# Patient Record
Sex: FEMALE | Race: WHITE | NOT HISPANIC OR LATINO | Employment: OTHER | URBAN - METROPOLITAN AREA
[De-identification: names, ages, dates, MRNs, and addresses within clinical notes are randomized per-mention and may not be internally consistent; named-entity substitution may affect disease eponyms.]

---

## 2021-02-09 ENCOUNTER — TRANSCRIBE ORDERS (OUTPATIENT)
Dept: ADMINISTRATIVE | Facility: HOSPITAL | Age: 86
End: 2021-02-09

## 2021-02-09 ENCOUNTER — APPOINTMENT (OUTPATIENT)
Dept: RADIOLOGY | Facility: CLINIC | Age: 86
End: 2021-02-09
Payer: MEDICARE

## 2021-02-09 DIAGNOSIS — M54.2 CERVICALGIA: ICD-10-CM

## 2021-02-09 DIAGNOSIS — M54.2 CERVICALGIA: Primary | ICD-10-CM

## 2021-02-09 PROCEDURE — 72050 X-RAY EXAM NECK SPINE 4/5VWS: CPT

## 2022-02-15 ENCOUNTER — HOSPITAL ENCOUNTER (INPATIENT)
Facility: HOSPITAL | Age: 87
LOS: 3 days | Discharge: HOME HEALTH | DRG: 689 | End: 2022-02-18
Attending: EMERGENCY MEDICINE | Admitting: INTERNAL MEDICINE
Payer: MEDICARE

## 2022-02-15 ENCOUNTER — APPOINTMENT (EMERGENCY)
Dept: RADIOLOGY | Facility: HOSPITAL | Age: 87
DRG: 689 | End: 2022-02-15
Payer: MEDICARE

## 2022-02-15 DIAGNOSIS — G93.40 ACUTE ENCEPHALOPATHY: ICD-10-CM

## 2022-02-15 DIAGNOSIS — G89.29 CHRONIC PAIN: Primary | ICD-10-CM

## 2022-02-15 DIAGNOSIS — N39.0 UTI (URINARY TRACT INFECTION): ICD-10-CM

## 2022-02-15 DIAGNOSIS — R45.1 AGITATION: ICD-10-CM

## 2022-02-15 DIAGNOSIS — E86.0 ACUTE DEHYDRATION: ICD-10-CM

## 2022-02-15 PROBLEM — G47.33 OBSTRUCTIVE SLEEP APNEA SYNDROME: Status: ACTIVE | Noted: 2022-02-15

## 2022-02-15 PROBLEM — R53.1 WEAKNESS: Status: ACTIVE | Noted: 2021-03-29

## 2022-02-15 PROBLEM — M79.602 LEFT ARM PAIN: Status: ACTIVE | Noted: 2022-02-15

## 2022-02-15 PROBLEM — R20.0 LEFT UPPER EXTREMITY NUMBNESS: Status: ACTIVE | Noted: 2021-03-30

## 2022-02-15 PROBLEM — S43.006A SHOULDER DISLOCATION: Status: ACTIVE | Noted: 2021-03-31

## 2022-02-15 PROBLEM — M54.50 LOW BACK PAIN: Status: ACTIVE | Noted: 2021-03-30

## 2022-02-15 PROBLEM — R41.82 ALTERED MENTAL STATUS: Status: ACTIVE | Noted: 2021-03-29

## 2022-02-15 PROBLEM — Z90.5 ABSENT KIDNEY: Status: ACTIVE | Noted: 2022-02-15

## 2022-02-15 PROBLEM — N17.9 AKI (ACUTE KIDNEY INJURY) (HCC): Status: ACTIVE | Noted: 2022-02-15

## 2022-02-15 PROBLEM — I10 ESSENTIAL HYPERTENSION: Status: ACTIVE | Noted: 2022-02-15

## 2022-02-15 PROBLEM — G93.41 ACUTE METABOLIC ENCEPHALOPATHY: Status: ACTIVE | Noted: 2022-02-15

## 2022-02-15 PROBLEM — F41.9 ANXIETY AND DEPRESSION: Status: ACTIVE | Noted: 2022-02-15

## 2022-02-15 PROBLEM — F32.A ANXIETY AND DEPRESSION: Status: ACTIVE | Noted: 2022-02-15

## 2022-02-15 LAB
ALBUMIN SERPL BCP-MCNC: 2.8 G/DL (ref 3.5–5)
ALP SERPL-CCNC: 97 U/L (ref 46–116)
ALT SERPL W P-5'-P-CCNC: 12 U/L (ref 12–78)
ANION GAP SERPL CALCULATED.3IONS-SCNC: 5 MMOL/L (ref 4–13)
ARTERIAL PATENCY WRIST A: ABNORMAL
AST SERPL W P-5'-P-CCNC: 12 U/L (ref 5–45)
BACTERIA UR QL AUTO: ABNORMAL /HPF
BASE EXCESS BLDA CALC-SCNC: 1 MMOL/L (ref -2–3)
BASOPHILS # BLD AUTO: 0.05 THOUSANDS/ΜL (ref 0–0.1)
BASOPHILS NFR BLD AUTO: 1 % (ref 0–1)
BILIRUB SERPL-MCNC: 0.2 MG/DL (ref 0.2–1)
BILIRUB UR QL STRIP: NEGATIVE
BUN SERPL-MCNC: 19 MG/DL (ref 5–25)
CA-I BLD-SCNC: 1.28 MMOL/L (ref 1.12–1.32)
CALCIUM ALBUM COR SERPL-MCNC: 9.8 MG/DL (ref 8.3–10.1)
CALCIUM SERPL-MCNC: 8.8 MG/DL (ref 8.3–10.1)
CHLORIDE SERPL-SCNC: 100 MMOL/L (ref 100–108)
CLARITY UR: CLEAR
CO2 SERPL-SCNC: 29 MMOL/L (ref 21–32)
COLOR UR: YELLOW
CREAT SERPL-MCNC: 1.57 MG/DL (ref 0.6–1.3)
EOSINOPHIL # BLD AUTO: 0.14 THOUSAND/ΜL (ref 0–0.61)
EOSINOPHIL NFR BLD AUTO: 2 % (ref 0–6)
ERYTHROCYTE [DISTWIDTH] IN BLOOD BY AUTOMATED COUNT: 13.1 % (ref 11.6–15.1)
FIO2 GAS DIL.REBREATH: 21 L
GFR SERPL CREATININE-BSD FRML MDRD: 29 ML/MIN/1.73SQ M
GLUCOSE SERPL-MCNC: 108 MG/DL (ref 65–140)
GLUCOSE SERPL-MCNC: 122 MG/DL (ref 65–140)
GLUCOSE UR STRIP-MCNC: NEGATIVE MG/DL
HCO3 BLDA-SCNC: 26.2 MMOL/L (ref 22–28)
HCT VFR BLD AUTO: 35.9 % (ref 34.8–46.1)
HCT VFR BLD CALC: 32 % (ref 34.8–46.1)
HGB BLD-MCNC: 11.1 G/DL (ref 11.5–15.4)
HGB BLDA-MCNC: 10.9 G/DL (ref 11.5–15.4)
HGB UR QL STRIP.AUTO: ABNORMAL
IMM GRANULOCYTES # BLD AUTO: 0.02 THOUSAND/UL (ref 0–0.2)
IMM GRANULOCYTES NFR BLD AUTO: 0 % (ref 0–2)
KETONES UR STRIP-MCNC: NEGATIVE MG/DL
LEUKOCYTE ESTERASE UR QL STRIP: ABNORMAL
LYMPHOCYTES # BLD AUTO: 1.24 THOUSANDS/ΜL (ref 0.6–4.47)
LYMPHOCYTES NFR BLD AUTO: 17 % (ref 14–44)
MCH RBC QN AUTO: 28.5 PG (ref 26.8–34.3)
MCHC RBC AUTO-ENTMCNC: 30.9 G/DL (ref 31.4–37.4)
MCV RBC AUTO: 92 FL (ref 82–98)
MONOCYTES # BLD AUTO: 0.66 THOUSAND/ΜL (ref 0.17–1.22)
MONOCYTES NFR BLD AUTO: 9 % (ref 4–12)
NEUTROPHILS # BLD AUTO: 5.1 THOUSANDS/ΜL (ref 1.85–7.62)
NEUTS SEG NFR BLD AUTO: 71 % (ref 43–75)
NITRITE UR QL STRIP: NEGATIVE
NON-SQ EPI CELLS URNS QL MICRO: ABNORMAL /HPF
NRBC BLD AUTO-RTO: 0 /100 WBCS
PCO2 BLD: 28 MMOL/L (ref 21–32)
PCO2 BLD: 44.9 MM HG (ref 36–44)
PH BLD: 7.37 [PH] (ref 7.35–7.45)
PH UR STRIP.AUTO: 6 [PH]
PLATELET # BLD AUTO: 283 THOUSANDS/UL (ref 149–390)
PMV BLD AUTO: 8.5 FL (ref 8.9–12.7)
PO2 BLD: 76 MM HG (ref 75–129)
POTASSIUM BLD-SCNC: 4.6 MMOL/L (ref 3.5–5.3)
POTASSIUM SERPL-SCNC: 4.7 MMOL/L (ref 3.5–5.3)
PROT SERPL-MCNC: 6.4 G/DL (ref 6.4–8.2)
PROT UR STRIP-MCNC: NEGATIVE MG/DL
RBC # BLD AUTO: 3.9 MILLION/UL (ref 3.81–5.12)
RBC #/AREA URNS AUTO: ABNORMAL /HPF
SAMPLE SITE: ABNORMAL
SAO2 % BLD FROM PO2: 95 % (ref 60–85)
SODIUM BLD-SCNC: 136 MMOL/L (ref 136–145)
SODIUM SERPL-SCNC: 134 MMOL/L (ref 136–145)
SP GR UR STRIP.AUTO: <=1.005 (ref 1–1.03)
SPECIMEN SOURCE: ABNORMAL
TSH SERPL DL<=0.05 MIU/L-ACNC: 2.33 UIU/ML (ref 0.36–3.74)
UROBILINOGEN UR QL STRIP.AUTO: 0.2 E.U./DL
WBC # BLD AUTO: 7.21 THOUSAND/UL (ref 4.31–10.16)
WBC #/AREA URNS AUTO: ABNORMAL /HPF

## 2022-02-15 PROCEDURE — 99285 EMERGENCY DEPT VISIT HI MDM: CPT | Performed by: EMERGENCY MEDICINE

## 2022-02-15 PROCEDURE — 96375 TX/PRO/DX INJ NEW DRUG ADDON: CPT

## 2022-02-15 PROCEDURE — 96374 THER/PROPH/DIAG INJ IV PUSH: CPT

## 2022-02-15 PROCEDURE — 81001 URINALYSIS AUTO W/SCOPE: CPT | Performed by: EMERGENCY MEDICINE

## 2022-02-15 PROCEDURE — 80053 COMPREHEN METABOLIC PANEL: CPT | Performed by: EMERGENCY MEDICINE

## 2022-02-15 PROCEDURE — 93005 ELECTROCARDIOGRAM TRACING: CPT

## 2022-02-15 PROCEDURE — 84443 ASSAY THYROID STIM HORMONE: CPT | Performed by: EMERGENCY MEDICINE

## 2022-02-15 PROCEDURE — 99223 1ST HOSP IP/OBS HIGH 75: CPT | Performed by: INTERNAL MEDICINE

## 2022-02-15 PROCEDURE — 87077 CULTURE AEROBIC IDENTIFY: CPT | Performed by: EMERGENCY MEDICINE

## 2022-02-15 PROCEDURE — 84295 ASSAY OF SERUM SODIUM: CPT

## 2022-02-15 PROCEDURE — 99285 EMERGENCY DEPT VISIT HI MDM: CPT

## 2022-02-15 PROCEDURE — 87086 URINE CULTURE/COLONY COUNT: CPT | Performed by: EMERGENCY MEDICINE

## 2022-02-15 PROCEDURE — 87186 SC STD MICRODIL/AGAR DIL: CPT | Performed by: EMERGENCY MEDICINE

## 2022-02-15 PROCEDURE — 82803 BLOOD GASES ANY COMBINATION: CPT

## 2022-02-15 PROCEDURE — 85014 HEMATOCRIT: CPT

## 2022-02-15 PROCEDURE — 82330 ASSAY OF CALCIUM: CPT

## 2022-02-15 PROCEDURE — 71045 X-RAY EXAM CHEST 1 VIEW: CPT

## 2022-02-15 PROCEDURE — 1123F ACP DISCUSS/DSCN MKR DOCD: CPT | Performed by: EMERGENCY MEDICINE

## 2022-02-15 PROCEDURE — 82947 ASSAY GLUCOSE BLOOD QUANT: CPT

## 2022-02-15 PROCEDURE — 36415 COLL VENOUS BLD VENIPUNCTURE: CPT | Performed by: EMERGENCY MEDICINE

## 2022-02-15 PROCEDURE — 84132 ASSAY OF SERUM POTASSIUM: CPT

## 2022-02-15 PROCEDURE — 36600 WITHDRAWAL OF ARTERIAL BLOOD: CPT

## 2022-02-15 PROCEDURE — 85025 COMPLETE CBC W/AUTO DIFF WBC: CPT | Performed by: EMERGENCY MEDICINE

## 2022-02-15 RX ORDER — ASPIRIN 81 MG/1
81 TABLET, CHEWABLE ORAL DAILY
COMMUNITY

## 2022-02-15 RX ORDER — LORAZEPAM 0.5 MG/1
0.5 TABLET ORAL EVERY 8 HOURS PRN
COMMUNITY
End: 2022-02-18 | Stop reason: HOSPADM

## 2022-02-15 RX ORDER — LOSARTAN POTASSIUM 50 MG/1
100 TABLET ORAL DAILY
Status: DISCONTINUED | OUTPATIENT
Start: 2022-02-16 | End: 2022-02-18 | Stop reason: HOSPADM

## 2022-02-15 RX ORDER — PANTOPRAZOLE SODIUM 40 MG/1
40 INJECTION, POWDER, FOR SOLUTION INTRAVENOUS DAILY
COMMUNITY
End: 2022-02-15

## 2022-02-15 RX ORDER — PANTOPRAZOLE SODIUM 40 MG/1
40 TABLET, DELAYED RELEASE ORAL DAILY
COMMUNITY

## 2022-02-15 RX ORDER — LORAZEPAM 2 MG/ML
1 INJECTION INTRAMUSCULAR ONCE
Status: COMPLETED | OUTPATIENT
Start: 2022-02-15 | End: 2022-02-15

## 2022-02-15 RX ORDER — ASPIRIN 81 MG/1
81 TABLET, CHEWABLE ORAL DAILY
Status: DISCONTINUED | OUTPATIENT
Start: 2022-02-16 | End: 2022-02-18 | Stop reason: HOSPADM

## 2022-02-15 RX ORDER — LORATADINE 10 MG/1
10 TABLET ORAL DAILY
Status: DISCONTINUED | OUTPATIENT
Start: 2022-02-16 | End: 2022-02-18 | Stop reason: HOSPADM

## 2022-02-15 RX ORDER — LIDOCAINE 50 MG/G
1 PATCH TOPICAL ONCE
Status: COMPLETED | OUTPATIENT
Start: 2022-02-15 | End: 2022-02-16

## 2022-02-15 RX ORDER — SODIUM CHLORIDE 9 MG/ML
75 INJECTION, SOLUTION INTRAVENOUS CONTINUOUS
Status: DISCONTINUED | OUTPATIENT
Start: 2022-02-15 | End: 2022-02-17

## 2022-02-15 RX ORDER — OLANZAPINE 10 MG/1
5 INJECTION, POWDER, LYOPHILIZED, FOR SOLUTION INTRAMUSCULAR ONCE
Status: COMPLETED | OUTPATIENT
Start: 2022-02-15 | End: 2022-02-15

## 2022-02-15 RX ORDER — TRAZODONE HYDROCHLORIDE 50 MG/1
100 TABLET ORAL
COMMUNITY

## 2022-02-15 RX ORDER — GABAPENTIN 100 MG/1
100 CAPSULE ORAL DAILY
COMMUNITY

## 2022-02-15 RX ORDER — ACETAMINOPHEN 325 MG/1
650 TABLET ORAL EVERY 6 HOURS PRN
Status: DISCONTINUED | OUTPATIENT
Start: 2022-02-15 | End: 2022-02-18 | Stop reason: HOSPADM

## 2022-02-15 RX ORDER — CEFTRIAXONE 1 G/50ML
1000 INJECTION, SOLUTION INTRAVENOUS ONCE
Status: COMPLETED | OUTPATIENT
Start: 2022-02-15 | End: 2022-02-15

## 2022-02-15 RX ORDER — ONDANSETRON 2 MG/ML
4 INJECTION INTRAMUSCULAR; INTRAVENOUS EVERY 6 HOURS PRN
Status: DISCONTINUED | OUTPATIENT
Start: 2022-02-15 | End: 2022-02-18 | Stop reason: HOSPADM

## 2022-02-15 RX ORDER — LOSARTAN POTASSIUM 50 MG/1
100 TABLET ORAL DAILY
COMMUNITY

## 2022-02-15 RX ORDER — HEPARIN SODIUM 5000 [USP'U]/ML
5000 INJECTION, SOLUTION INTRAVENOUS; SUBCUTANEOUS EVERY 8 HOURS SCHEDULED
Status: DISCONTINUED | OUTPATIENT
Start: 2022-02-15 | End: 2022-02-18 | Stop reason: HOSPADM

## 2022-02-15 RX ORDER — CEFTRIAXONE 1 G/50ML
1000 INJECTION, SOLUTION INTRAVENOUS EVERY 24 HOURS
Status: DISCONTINUED | OUTPATIENT
Start: 2022-02-16 | End: 2022-02-18 | Stop reason: HOSPADM

## 2022-02-15 RX ORDER — AMLODIPINE BESYLATE 5 MG/1
10 TABLET ORAL DAILY
Status: DISCONTINUED | OUTPATIENT
Start: 2022-02-16 | End: 2022-02-18 | Stop reason: HOSPADM

## 2022-02-15 RX ORDER — MORPHINE SULFATE 4 MG/ML
4 INJECTION, SOLUTION INTRAMUSCULAR; INTRAVENOUS ONCE
Status: COMPLETED | OUTPATIENT
Start: 2022-02-15 | End: 2022-02-15

## 2022-02-15 RX ORDER — ATORVASTATIN CALCIUM 40 MG/1
40 TABLET, FILM COATED ORAL
Status: DISCONTINUED | OUTPATIENT
Start: 2022-02-16 | End: 2022-02-18 | Stop reason: HOSPADM

## 2022-02-15 RX ORDER — PANTOPRAZOLE SODIUM 40 MG/1
40 TABLET, DELAYED RELEASE ORAL DAILY
Status: DISCONTINUED | OUTPATIENT
Start: 2022-02-16 | End: 2022-02-18 | Stop reason: HOSPADM

## 2022-02-15 RX ORDER — AMLODIPINE BESYLATE 10 MG/1
10 TABLET ORAL DAILY
COMMUNITY

## 2022-02-15 RX ORDER — CETIRIZINE HYDROCHLORIDE 10 MG/1
10 TABLET ORAL DAILY
COMMUNITY

## 2022-02-15 RX ORDER — ESCITALOPRAM OXALATE 5 MG/1
5 TABLET ORAL DAILY
Status: DISCONTINUED | OUTPATIENT
Start: 2022-02-16 | End: 2022-02-18 | Stop reason: HOSPADM

## 2022-02-15 RX ORDER — HALOPERIDOL 5 MG/ML
2 INJECTION INTRAMUSCULAR ONCE
Status: DISCONTINUED | OUTPATIENT
Start: 2022-02-15 | End: 2022-02-15

## 2022-02-15 RX ORDER — GABAPENTIN 100 MG/1
100 CAPSULE ORAL 3 TIMES DAILY
Status: DISCONTINUED | OUTPATIENT
Start: 2022-02-15 | End: 2022-02-18 | Stop reason: HOSPADM

## 2022-02-15 RX ORDER — OXYCODONE HYDROCHLORIDE 5 MG/1
2.5 TABLET ORAL EVERY 6 HOURS PRN
Status: DISCONTINUED | OUTPATIENT
Start: 2022-02-15 | End: 2022-02-17

## 2022-02-15 RX ORDER — ESCITALOPRAM OXALATE 5 MG/1
1 TABLET ORAL DAILY
COMMUNITY

## 2022-02-15 RX ORDER — ROSUVASTATIN CALCIUM 20 MG/1
20 TABLET, COATED ORAL DAILY
COMMUNITY

## 2022-02-15 RX ADMIN — GABAPENTIN 100 MG: 100 CAPSULE ORAL at 23:34

## 2022-02-15 RX ADMIN — OLANZAPINE 5 MG: 10 INJECTION, POWDER, FOR SOLUTION INTRAMUSCULAR at 20:48

## 2022-02-15 RX ADMIN — CEFTRIAXONE 1000 MG: 1 INJECTION, SOLUTION INTRAVENOUS at 20:53

## 2022-02-15 RX ADMIN — HEPARIN SODIUM 5000 UNITS: 5000 INJECTION, SOLUTION INTRAVENOUS; SUBCUTANEOUS at 23:34

## 2022-02-15 RX ADMIN — MORPHINE SULFATE 4 MG: 4 INJECTION INTRAVENOUS at 15:53

## 2022-02-15 RX ADMIN — SODIUM CHLORIDE 1000 ML: 0.9 INJECTION, SOLUTION INTRAVENOUS at 16:26

## 2022-02-15 RX ADMIN — LORAZEPAM 1 MG: 2 INJECTION INTRAMUSCULAR; INTRAVENOUS at 17:11

## 2022-02-15 RX ADMIN — SODIUM CHLORIDE 125 ML/HR: 0.9 INJECTION, SOLUTION INTRAVENOUS at 20:52

## 2022-02-15 RX ADMIN — LIDOCAINE 5% 1 PATCH: 700 PATCH TOPICAL at 15:52

## 2022-02-15 NOTE — ED PROVIDER NOTES
History  Chief Complaint   Patient presents with    Back Pain     patient presents to the ED with c/o chronic back pain and general malaise, states she feels tired  states hand swelling is a normal finding for her      Patient brought in by ambulance from home with chronic pain and left neck/ arm/ hand pain not relieved with nerve block, left shoulder replacement, hydrocodone  Living with daughter since December, gradual confusion since January  Has home PT, uses [facility], currently on abx for UTI  Has indwelling spencer catheter  Hx from patient and daughter  Patient is disoriented, poor historian  Daughter states she has acute episode of left neck and arm pain/ weakness, swelling for about 1 week now  Pain not relieved with nerve block, hydrocodone  Chronic pain started March 2021 when she fell and hit arm of chart and a cart at [facility]  Patient has a neck brace but it is uncomfortable  She had inconclusive MRI when she fell  She has room available at [facility] next month because she is on lifetime plan there  Prior to Admission Medications   Prescriptions Last Dose Informant Patient Reported? Taking?    LORazepam (ATIVAN) 0 5 mg tablet   Yes Yes   Sig: Take 0 5 mg by mouth every 8 (eight) hours as needed for anxiety   amLODIPine (NORVASC) 10 mg tablet   Yes No   Sig: Take 10 mg by mouth daily   aspirin 81 mg chewable tablet   Yes Yes   Sig: Chew 81 mg daily   cetirizine (ZyrTEC) 10 mg tablet   Yes Yes   Sig: Take 10 mg by mouth daily   escitalopram (LEXAPRO) 5 mg tablet   Yes Yes   Sig: Take 5 mg by mouth daily   gabapentin (NEURONTIN) 100 mg capsule   Yes Yes   Sig: Take 100 mg by mouth 3 (three) times a day   losartan (COZAAR) 50 mg tablet   Yes No   Sig: Take 100 mg by mouth daily   pantoprazole (PROTONIX) 40 mg tablet   Yes Yes   Sig: Take 40 mg by mouth daily   rosuvastatin (CRESTOR) 20 MG tablet   Yes No   Sig: Take 20 mg by mouth daily   traZODone (DESYREL) 50 mg tablet   Yes No   Sig: Take 50 mg by mouth daily      Facility-Administered Medications: None       History reviewed  No pertinent past medical history  Past Surgical History:   Procedure Laterality Date    NEPHRECTOMY Right 02/07/2006    TOTAL SHOULDER REPLACEMENT Bilateral 02/14/2020       History reviewed  No pertinent family history  I have reviewed and agree with the history as documented  E-Cigarette/Vaping     E-Cigarette/Vaping Substances     Social History     Tobacco Use    Smoking status: Never Smoker    Smokeless tobacco: Never Used   Substance Use Topics    Alcohol use: Not Currently    Drug use: Not Currently       Review of Systems   Constitutional: Negative for chills and fever  HENT: Negative for rhinorrhea and sore throat  Respiratory: Negative for shortness of breath  Cardiovascular: Negative for chest pain  Gastrointestinal: Negative for constipation, diarrhea, nausea and vomiting  Genitourinary: Negative for dysuria and frequency  Skin: Negative for rash  All other systems reviewed and are negative  Physical Exam  Physical Exam  Vitals and nursing note reviewed  Constitutional:       Appearance: She is well-developed  She is obese  She is ill-appearing  HENT:      Head: Normocephalic and atraumatic  Right Ear: External ear normal       Left Ear: External ear normal       Nose: Nose normal       Mouth/Throat:      Mouth: Mucous membranes are dry  Eyes:      Conjunctiva/sclera: Conjunctivae normal       Pupils: Pupils are equal, round, and reactive to light  Comments: Pupils 2 mm bilateral   Cardiovascular:      Rate and Rhythm: Normal rate and regular rhythm  Heart sounds: Normal heart sounds  Pulmonary:      Effort: Pulmonary effort is normal  No respiratory distress  Breath sounds: Normal breath sounds  No wheezing  Abdominal:      General: Bowel sounds are normal  There is no distension  Palpations: Abdomen is soft  Tenderness:  There is no abdominal tenderness  Musculoskeletal:         General: Swelling present  No deformity  Cervical back: Neck supple  No erythema or torticollis  Pain with movement, spinous process tenderness and muscular tenderness present  Decreased range of motion  Thoracic back: Tenderness present  Lumbar back: Tenderness present  Negative right straight leg raise test and negative left straight leg raise test       Comments: Left upper extremity swelling diffuse tenderness, good cap refill and radial pulse  Generalized weakness compared to right   Skin:     General: Skin is warm and dry  Findings: No rash  Neurological:      General: No focal deficit present  Mental Status: She is alert  She is disoriented  GCS: GCS eye subscore is 4  GCS verbal subscore is 4  GCS motor subscore is 6  Cranial Nerves: Cranial nerves are intact  Sensory: Sensation is intact  No sensory deficit  Motor: Weakness present  Coordination: Coordination is intact  Comments: Mild left upper and lower extremity weakness   Psychiatric:         Attention and Perception: Attention normal          Mood and Affect: Mood is anxious  Speech: Speech normal          Behavior: Behavior is cooperative           Vital Signs  ED Triage Vitals   Temperature Pulse Respirations Blood Pressure SpO2   02/15/22 1501 02/15/22 1500 02/15/22 1500 02/15/22 1501 02/15/22 1500   98 4 °F (36 9 °C) 84 20 146/63 96 %      Temp Source Heart Rate Source Patient Position - Orthostatic VS BP Location FiO2 (%)   02/15/22 1500 02/15/22 1500 02/15/22 1709 02/15/22 1709 --   Temporal Monitor Lying Right arm       Pain Score       02/15/22 1457       10 - Worst Possible Pain           Vitals:    02/15/22 1709 02/15/22 1838 02/15/22 2100 02/15/22 2257   BP: 170/70 147/93 (!) 172/100 (!) 172/62   Pulse: 81 80 82 83   Patient Position - Orthostatic VS: Lying Lying  Lying         Visual Acuity  Visual Acuity      Most Recent Value   L Pupil Size (mm) 2   R Pupil Size (mm) 2   L Pupil Shape Round   R Pupil Shape Round          ED Medications  Medications   lidocaine (LIDODERM) 5 % patch 1 patch (1 patch Topical Medication Applied 2/15/22 1552)   sodium chloride 0 9 % infusion (125 mL/hr Intravenous New Bag 2/15/22 2052)   amLODIPine (NORVASC) tablet 10 mg (has no administration in time range)   aspirin chewable tablet 81 mg (has no administration in time range)   loratadine (CLARITIN) tablet 10 mg (has no administration in time range)   escitalopram (LEXAPRO) tablet 5 mg (has no administration in time range)   gabapentin (NEURONTIN) capsule 100 mg (100 mg Oral Given 2/15/22 2334)   losartan (COZAAR) tablet 100 mg (has no administration in time range)   atorvastatin (LIPITOR) tablet 40 mg (has no administration in time range)   pantoprazole (PROTONIX) EC tablet 40 mg (has no administration in time range)   acetaminophen (TYLENOL) tablet 650 mg (has no administration in time range)   ondansetron (ZOFRAN) injection 4 mg (has no administration in time range)   heparin (porcine) subcutaneous injection 5,000 Units (5,000 Units Subcutaneous Given 2/15/22 2334)   cefTRIAXone (ROCEPHIN) IVPB (premix in dextrose) 1,000 mg 50 mL (has no administration in time range)   oxyCODONE (ROXICODONE) IR tablet 2 5 mg (has no administration in time range)   morphine (PF) 4 mg/mL injection 4 mg (4 mg Intravenous Given 2/15/22 1553)   sodium chloride 0 9 % bolus 1,000 mL (0 mL Intravenous Stopped 2/15/22 1656)   LORazepam (ATIVAN) injection 1 mg (1 mg Intravenous Given 2/15/22 1711)   cefTRIAXone (ROCEPHIN) IVPB (premix in dextrose) 1,000 mg 50 mL (0 mg Intravenous Stopped 2/15/22 2108)   OLANZapine (ZyPREXA) IM injection 5 mg (5 mg Intramuscular Given 2/15/22 2048)       Diagnostic Studies  Results Reviewed     Procedure Component Value Units Date/Time    Platelet count [[id number]]     Lab Status: No result Specimen: Blood     Urine Microscopic [[id number]] (Abnormal) Collected: 02/15/22 2008    Lab Status: Final result Specimen: Urine, Indwelling Ambrosio Catheter Updated: 02/15/22 2115     RBC, UA 4-10 /hpf      WBC, UA 10-20 /hpf      Epithelial Cells Moderate /hpf      Bacteria, UA Occasional /hpf     Urine culture [[id number]] Collected: 02/15/22 2008    Lab Status:  In process Specimen: Urine, Indwelling Ambrosio Catheter Updated: 02/15/22 2115    UA w Reflex to Microscopic w Reflex to Culture [[id number]]  (Abnormal) Collected: 02/15/22 2008    Lab Status: Final result Specimen: Urine, Indwelling Ambrosio Catheter Updated: 02/15/22 2016     Color, UA Yellow     Clarity, UA Clear     Specific Gravity, UA <=1 005     pH, UA 6 0     Leukocytes, UA Moderate     Nitrite, UA Negative     Protein, UA Negative mg/dl      Glucose, UA Negative mg/dl      Ketones, UA Negative mg/dl      Urobilinogen, UA 0 2 E U /dl      Bilirubin, UA Negative     Blood, UA Large    POCT Blood Gas (CG8+) [[id number]]  (Abnormal) Collected: 02/15/22 1816    Lab Status: Final result Specimen: Arterial Updated: 02/15/22 1824     pH, Art i-STAT 7 374     pCO2, Art i-STAT 44 9 mm HG      pO2, ART i-STAT 76 0 mm HG      BE, i-STAT 1 mmol/L      HCO3, Art i-STAT 26 2 mmol/L      CO2, i-STAT 28 mmol/L      O2 Sat, i-STAT 95 %      SODIUM, I-STAT 136 mmol/l      Potassium, i-STAT 4 6 mmol/L      Calcium, Ionized i-STAT 1 28 mmol/L      Hct, i-STAT 32 %      Hgb, i-STAT 10 9 g/dl      Glucose, i-STAT 108 mg/dl      POC FIO2 21 L      Specimen Type ARTERIAL     SITE Right Radial     JJ TEST Positive Allens Test    Blood gas, arterial [[id number]]     Lab Status: No result Specimen: Blood, Arterial     TSH [[id number]]  (Normal) Collected: 02/15/22 1553    Lab Status: Final result Specimen: Blood from Arm, Right Updated: 02/15/22 1742     TSH 3RD GENERATON 2 326 uIU/mL     Narrative:      Patients undergoing fluorescein dye angiography may retain small amounts of fluorescein in the body for 48-72 hours post procedure  Samples containing fluorescein can produce falsely depressed TSH values  If the patient had this procedure,a specimen should be resubmitted post fluorescein clearance        Comprehensive metabolic panel [[id number]]  (Abnormal) Collected: 02/15/22 1553    Lab Status: Final result Specimen: Blood from Arm, Right Updated: 02/15/22 1659     Sodium 134 mmol/L      Potassium 4 7 mmol/L      Chloride 100 mmol/L      CO2 29 mmol/L      ANION GAP 5 mmol/L      BUN 19 mg/dL      Creatinine 1 57 mg/dL      Glucose 122 mg/dL      Calcium 8 8 mg/dL      Corrected Calcium 9 8 mg/dL      AST 12 U/L      ALT 12 U/L      Alkaline Phosphatase 97 U/L      Total Protein 6 4 g/dL      Albumin 2 8 g/dL      Total Bilirubin 0 20 mg/dL      eGFR 29 ml/min/1 73sq m     Narrative:      National Kid[employer] Disease Foundation guidelines for Chronic Kidney Disease (CKD):     Stage 1 with normal or high GFR (GFR > 90 mL/min/1 73 square meters)    Stage 2 Mild CKD (GFR = 60-89 mL/min/1 73 square meters)    Stage 3A Moderate CKD (GFR = 45-59 mL/min/1 73 square meters)    Stage 3B Moderate CKD (GFR = 30-44 mL/min/1 73 square meters)    Stage 4 Severe CKD (GFR = 15-29 mL/min/1 73 square meters)    Stage 5 End Stage CKD (GFR <15 mL/min/1 73 square meters)  Note: GFR calculation is accurate only with a steady state creatinine    CBC and differential [[id number]]  (Abnormal) Collected: 02/15/22 1553    Lab Status: Final result Specimen: Blood from Arm, Right Updated: 02/15/22 1637     WBC 7 21 Thousand/uL      RBC 3 90 Million/uL      Hemoglobin 11 1 g/dL      Hematocrit 35 9 %      MCV 92 fL      MCH 28 5 pg      MCHC 30 9 g/dL      RDW 13 1 %      MPV 8 5 fL      Platelets 695 Thousands/uL      nRBC 0 /100 WBCs      Neutrophils Relative 71 %      Immat GRANS % 0 %      Lymphocytes Relative 17 %      Monocytes Relative 9 %      Eosinophils Relative 2 %      Basophils Relative 1 %      Neutrophils Absolute 5 10 Thousands/µL      Immature Grans Absolute 0 02 Thousand/uL      Lymphocytes Absolute 1 24 Thousands/µL      Monocytes Absolute 0 66 Thousand/µL      Eosinophils Absolute 0 14 Thousand/µL      Basophils Absolute 0 05 Thousands/µL                  XR chest portable   Final Result by Taiwo Martin MD (02/15 1626)      No acute cardiopulmonary disease  Workstation performed: [id number]                    Procedures  ECG 12 Lead Documentation Only    Date/Time: 2/15/2022 4:29 PM  Performed by: Alis Cueto DO  Authorized by: Alis Cueto DO     Indications / Diagnosis:  Weakness  ECG reviewed by me, the ED Provider: yes    Patient location:  ED  Previous ECG:     Previous ECG:  Unavailable  Interpretation:     Interpretation: normal    Rate:     ECG rate:  81    ECG rate assessment: normal    Rhythm:     Rhythm: sinus rhythm    Ectopy:     Ectopy: none    QRS:     QRS axis:  Normal    QRS intervals:  Normal  Conduction:     Conduction: normal    ST segments:     ST segments:  Normal  T waves:     T waves: normal               ED Course  ED Course as of 02/16/22 0152   Tue Feb 15, 2022   1525 Lumbar fracture with ganglion impar 1 year ago  1529 Patient fell Feb 2021 - injured per previously fractured neck   6084 Also not sure if she had stroke left side arm and leg, face weakness March 2021   1534 Just switched abx for UTI yesterday   1615 Reviewed with case management Debra can see in morning  1826 More agitated after ativan - abg nl, will order diet and reassess in a few hours    Reviewed with Alfonzo HOFFMAN  Number of Diagnoses or Management Options  Acute dehydration: new and requires workup  Acute encephalopathy: new and requires workup  Agitation: new and requires workup  Chronic pain: new and requires workup  UTI (urinary tract infection): new and requires workup     Amount and/or Complexity of Data Reviewed  Clinical lab tests: ordered and reviewed  Tests in the radiology section of CPT®: ordered and reviewed  Obtain history from someone other than the patient: yes  Discuss the patient with other providers: yes    Patient Progress  Patient progress: improved      Disposition  Final diagnoses:   Chronic pain   Acute dehydration   Acute encephalopathy - multifactorial   UTI (urinary tract infection) - resistant to outpatient abx   Agitation     Time reflects when diagnosis was documented in both MDM as applicable and the Disposition within this note     Time User Action Codes Description Comment    2/15/2022  5:16 PM [place] Add [G89 29] Chronic pain     2/15/2022  5:16 PM [place] Add [E86 0] Acute dehydration     2/15/2022  6:00 PM [place] Add [G93 40] Acute encephalopathy     2/15/2022  6:01 PM [place] Modify [G93 40] Acute encephalopathy multifactorial    2/15/2022  8:38 PM [place] Add [N39 0] UTI (urinary tract infection)     2/15/2022  8:38 PM [place] Modify [N39 0] UTI (urinary tract infection) resistant to outpatient abx    2/15/2022  8:39 PM [place] Add [R45 1] Agitation       ED Disposition     ED Disposition Condition Date/Time Comment    Admit Stable Tue Feb 15, 2022  8:38 PM Case was discussed with Jerry Carranza* and the patient's admission status was agreed to be Admission Status: inpatient status to the service of Dr Ryan Prabhakar*           Follow-up Information    None         Current Discharge Medication List      CONTINUE these medications which have NOT CHANGED    Details   aspirin 81 mg chewable tablet Chew 81 mg daily      cetirizine (ZyrTEC) 10 mg tablet Take 10 mg by mouth daily      escitalopram (LEXAPRO) 5 mg tablet Take 5 mg by mouth daily      gabapentin (NEURONTIN) 100 mg capsule Take 100 mg by mouth 3 (three) times a day      LORazepam (ATIVAN) 0 5 mg tablet Take 0 5 mg by mouth every 8 (eight) hours as needed for anxiety      pantoprazole (PROTONIX) 40 mg tablet Take 40 mg by mouth daily      amLODIPine (NORVASC) 10 mg tablet Take 10 mg by mouth daily      losartan (COZAAR) 50 mg tablet Take 100 mg by mouth daily      rosuvastatin (CRESTOR) 20 MG tablet Take 20 mg by mouth daily      traZODone (DESYREL) 50 mg tablet Take 50 mg by mouth daily             No discharge procedures on file      PDMP Review     None          ED Provider  Electronically Signed by           Sachi Reveles DO  02/16/22 0156

## 2022-02-16 PROBLEM — E87.1 HYPONATREMIA: Status: ACTIVE | Noted: 2022-02-16

## 2022-02-16 LAB
ANION GAP SERPL CALCULATED.3IONS-SCNC: 7 MMOL/L (ref 4–13)
BASOPHILS # BLD AUTO: 0.03 THOUSANDS/ΜL (ref 0–0.1)
BASOPHILS NFR BLD AUTO: 0 % (ref 0–1)
BUN SERPL-MCNC: 14 MG/DL (ref 5–25)
CALCIUM SERPL-MCNC: 9.1 MG/DL (ref 8.3–10.1)
CHLORIDE SERPL-SCNC: 105 MMOL/L (ref 100–108)
CO2 SERPL-SCNC: 28 MMOL/L (ref 21–32)
CREAT SERPL-MCNC: 1.31 MG/DL (ref 0.6–1.3)
EOSINOPHIL # BLD AUTO: 0.13 THOUSAND/ΜL (ref 0–0.61)
EOSINOPHIL NFR BLD AUTO: 2 % (ref 0–6)
ERYTHROCYTE [DISTWIDTH] IN BLOOD BY AUTOMATED COUNT: 13.2 % (ref 11.6–15.1)
GFR SERPL CREATININE-BSD FRML MDRD: 36 ML/MIN/1.73SQ M
GLUCOSE SERPL-MCNC: 84 MG/DL (ref 65–140)
HCT VFR BLD AUTO: 35.4 % (ref 34.8–46.1)
HGB BLD-MCNC: 11.2 G/DL (ref 11.5–15.4)
IMM GRANULOCYTES # BLD AUTO: 0.01 THOUSAND/UL (ref 0–0.2)
IMM GRANULOCYTES NFR BLD AUTO: 0 % (ref 0–2)
LYMPHOCYTES # BLD AUTO: 2.8 THOUSANDS/ΜL (ref 0.6–4.47)
LYMPHOCYTES NFR BLD AUTO: 31 % (ref 14–44)
MCH RBC QN AUTO: 28.9 PG (ref 26.8–34.3)
MCHC RBC AUTO-ENTMCNC: 31.6 G/DL (ref 31.4–37.4)
MCV RBC AUTO: 91 FL (ref 82–98)
MONOCYTES # BLD AUTO: 0.87 THOUSAND/ΜL (ref 0.17–1.22)
MONOCYTES NFR BLD AUTO: 10 % (ref 4–12)
NEUTROPHILS # BLD AUTO: 5.12 THOUSANDS/ΜL (ref 1.85–7.62)
NEUTS SEG NFR BLD AUTO: 57 % (ref 43–75)
NRBC BLD AUTO-RTO: 0 /100 WBCS
PLATELET # BLD AUTO: 260 THOUSANDS/UL (ref 149–390)
PMV BLD AUTO: 9.2 FL (ref 8.9–12.7)
POTASSIUM SERPL-SCNC: 5.1 MMOL/L (ref 3.5–5.3)
RBC # BLD AUTO: 3.88 MILLION/UL (ref 3.81–5.12)
SODIUM SERPL-SCNC: 140 MMOL/L (ref 136–145)
WBC # BLD AUTO: 8.96 THOUSAND/UL (ref 4.31–10.16)

## 2022-02-16 PROCEDURE — 97163 PT EVAL HIGH COMPLEX 45 MIN: CPT

## 2022-02-16 PROCEDURE — 85025 COMPLETE CBC W/AUTO DIFF WBC: CPT | Performed by: INTERNAL MEDICINE

## 2022-02-16 PROCEDURE — 80048 BASIC METABOLIC PNL TOTAL CA: CPT | Performed by: INTERNAL MEDICINE

## 2022-02-16 PROCEDURE — 99232 SBSQ HOSP IP/OBS MODERATE 35: CPT | Performed by: INTERNAL MEDICINE

## 2022-02-16 RX ORDER — HYDROCODONE BITARTRATE AND ACETAMINOPHEN 5; 325 MG/1; MG/1
1 TABLET ORAL EVERY 6 HOURS PRN
COMMUNITY
End: 2022-02-18 | Stop reason: HOSPADM

## 2022-02-16 RX ORDER — MELATONIN
1000 DAILY
COMMUNITY

## 2022-02-16 RX ORDER — NYSTATIN 100000 [USP'U]/G
1 POWDER TOPICAL 4 TIMES DAILY
COMMUNITY

## 2022-02-16 RX ORDER — NYSTATIN 100000 U/G
1 CREAM TOPICAL 2 TIMES DAILY
COMMUNITY

## 2022-02-16 RX ADMIN — LORATADINE 10 MG: 10 TABLET ORAL at 08:49

## 2022-02-16 RX ADMIN — SODIUM CHLORIDE 75 ML/HR: 0.9 INJECTION, SOLUTION INTRAVENOUS at 20:00

## 2022-02-16 RX ADMIN — PANTOPRAZOLE SODIUM 40 MG: 40 TABLET, DELAYED RELEASE ORAL at 08:49

## 2022-02-16 RX ADMIN — GABAPENTIN 100 MG: 100 CAPSULE ORAL at 20:30

## 2022-02-16 RX ADMIN — HEPARIN SODIUM 5000 UNITS: 5000 INJECTION, SOLUTION INTRAVENOUS; SUBCUTANEOUS at 06:29

## 2022-02-16 RX ADMIN — GABAPENTIN 100 MG: 100 CAPSULE ORAL at 16:36

## 2022-02-16 RX ADMIN — ATORVASTATIN CALCIUM 40 MG: 40 TABLET, FILM COATED ORAL at 16:36

## 2022-02-16 RX ADMIN — LOSARTAN POTASSIUM 100 MG: 50 TABLET, FILM COATED ORAL at 08:49

## 2022-02-16 RX ADMIN — ESCITALOPRAM 5 MG: 5 TABLET, FILM COATED ORAL at 08:49

## 2022-02-16 RX ADMIN — AMLODIPINE BESYLATE 10 MG: 5 TABLET ORAL at 08:49

## 2022-02-16 RX ADMIN — CEFTRIAXONE 1000 MG: 1 INJECTION, SOLUTION INTRAVENOUS at 20:46

## 2022-02-16 RX ADMIN — ACETAMINOPHEN 650 MG: 325 TABLET, FILM COATED ORAL at 13:17

## 2022-02-16 RX ADMIN — HEPARIN SODIUM 5000 UNITS: 5000 INJECTION, SOLUTION INTRAVENOUS; SUBCUTANEOUS at 13:08

## 2022-02-16 RX ADMIN — OXYCODONE HYDROCHLORIDE 2.5 MG: 5 TABLET ORAL at 10:34

## 2022-02-16 RX ADMIN — GABAPENTIN 100 MG: 100 CAPSULE ORAL at 08:49

## 2022-02-16 RX ADMIN — HEPARIN SODIUM 5000 UNITS: 5000 INJECTION, SOLUTION INTRAVENOUS; SUBCUTANEOUS at 23:00

## 2022-02-16 RX ADMIN — OXYCODONE HYDROCHLORIDE 2.5 MG: 5 TABLET ORAL at 20:30

## 2022-02-16 RX ADMIN — OXYCODONE HYDROCHLORIDE 2.5 MG: 5 TABLET ORAL at 04:00

## 2022-02-16 RX ADMIN — ASPIRIN 81 MG CHEWABLE TABLET 81 MG: 81 TABLET CHEWABLE at 08:49

## 2022-02-16 NOTE — ASSESSMENT & PLAN NOTE
· Chronic left arm/neck pain since fall in March 2021  At that time no fractures noted   · Over the past year daughter reports intermittent pain and swelling to the area that is treated with hydrocodone and nerve block  Was not successful this time    · Denies recent fall or injury to the area  · Manage pain   · Consult PT/OT

## 2022-02-16 NOTE — ASSESSMENT & PLAN NOTE
· Patient admitted with acute kidney injury with creatinine of 1 57, poor oral intake and noted to have dehydration  · Creatinine 1 57 on admission   · Creatinine from 1 year ago was about 1 0  · Received 1 L bolus of fluids and is now on maintenance fluids   · Creatinine this morning is 1 3  · DC IV fluids  · Avoid hypotension/nephrotoxins medications

## 2022-02-16 NOTE — ASSESSMENT & PLAN NOTE
· Chronic left arm/neck pain since fall in March 2021  At that time no fractures noted   · Over the past year daughter reports intermittent pain and swelling to the area that is treated with hydrocodone and nerve block  Was not successful this time    · Denies recent fall or injury to the area  · Manage pain   · PT/OT consult

## 2022-02-16 NOTE — ASSESSMENT & PLAN NOTE
· Creatinine 1 57 on admission   · Creatinine from 1 year ago was about 1 0  · Received 1 L bolus of fluids and is now on maintenance fluids   · Creatinine this morning is 1 3  · On IV fluids  · Avoid hypotension/nephrotoxins medications

## 2022-02-16 NOTE — PLAN OF CARE
Problem: PHYSICAL THERAPY ADULT  Goal: Performs mobility at highest level of function for planned discharge setting  See evaluation for individualized goals  Description: Treatment/Interventions: ADL retraining,Functional transfer training,LE strengthening/ROM,Elevations,Therapeutic exercise,Endurance training,Cognitive reorientation,Patient/family training,Equipment eval/education,Bed mobility,Gait training,Compensatory technique education,Continued evaluation,OT          See flowsheet documentation for full assessment, interventions and recommendations  Note: Prognosis: Guarded  Problem List: Decreased strength,Decreased range of motion,Impaired balance,Decreased endurance,Decreased mobility,Decreased coordination,Decreased cognition,Impaired judgement,Decreased safety awareness,Obesity  Assessment: Pt is an 80 y o  female who presented to ED 2/15/22 with c/o chronic back pain, tired, hand swelling, confusion  Dx:  Dehydration, encephalopathy, agitation, UTI  Comorbidities affecting pt's physical performance at time of assessment include: Shoulder replacement, confusion, COVID  Personal factors affecting pt at time of IE include: confusion, obesity, difficulty using LUE/shoulder, self limiting  PLOF and home set up listed above, unable to fully assess;  concerns for return home include but are not limited to physical assist x 2, possible steps/elevations  Upon evaluation: Pt requires max A x 2 for bed mobility, unable to stand  Full objective findings from PT assessment regarding body systems outlined above  Current limitations include impaired balance, decreased endurance/activity tolerance, decreased BUE and BLE strength and AROM, unable to stand, confusion  The following objective measures performed on IE also reveal limitations: agitation  Pt's clinical presentation is currently unstable/unpredictable seen in pt's presentation of continuous monitoring, fall risk, and confusion     Pt would benefit from continued PT while in hospital and follow up with inpt rehab at D/C to increase strength, balance, endurance, independence with funcitonal mobility to return to PLOF, maximize independence and decrease caregiver burden and improve quality of life  The patient's AM-PAC Basic Mobility Inpatient Short Form Raw Score is 8  A Raw score of less than or equal to 17 suggests the patient may benefit from discharge to post-acute rehabilitation services  Please also refer to the recommendation of the Physical Therapist for safe discharge planning  Barriers to Discharge: Decreased caregiver support,Inaccessible home environment (unable to fully assess home set up/PLOF, cont to assess)        PT Discharge Recommendation: Post acute rehabilitation services          See flowsheet documentation for full assessment

## 2022-02-16 NOTE — ASSESSMENT & PLAN NOTE
· Creatinine 1 57 on admission   · Creatinine from 1 year ago was about 1 0  · Received 1 L bolus of fluids and is now on maintenance fluids   · Continue to monitor

## 2022-02-16 NOTE — CASE MANAGEMENT
Case Management Assessment & Discharge Planning Note    Patient name Comfort Gama  Location /-07 MRN [id number]  : 1933 Date 2022       Current Admission Date: 2/15/2022  Current Admission Diagnosis:Acute metabolic encephalopathy   Patient Active Problem List    Diagnosis Date Noted    Hyponatremia 2022    Absent kidney 02/15/2022    Obstructive sleep apnea syndrome 02/15/2022    Acute metabolic encephalopathy     Left arm pain 02/15/2022    RYANNE (acute kidney injury) ([facility] [address] ) 02/15/2022    Essential hypertension 02/15/2022    Anxiety and depression 02/15/2022    Shoulder dislocation 2021    Left upper extremity numbness 2021    Lumbar pain 2021    Weakness 2021    Altered mental status 2021    UTI (urinary tract infection) 2017    Overactive bladder 2011      LOS (days): 1  Geometric Mean LOS (GMLOS) (days): 3 80  Days to GMLOS:2 9     OBJECTIVE:    Risk of Unplanned Readmission Score: 13         Current admission status: Inpatient       Preferred Pharmacy:   [address] [facility]  [address] [facility]  [facility] [address]  Phone: [phone] Fax: [phone]    Primary Care Provider: Sarah John MD    Primary Insurance: MEDICARE  Secondary Insurance: [facility] HEALTH OPTIONS PROGRAM    ASSESSMENT:  Active Health Care Agents    There are no active Health Care Agents on file         Advance Directives  Does patient have a Health Care POA?: Yes  Does patient have Advance Directives?: Yes  Advance Directives: Living will,Power of  for health care  Primary Contact: Nick Glover         Readmission Root Cause  30 Day Readmission: No    Patient Information  Admitted from[de-identified] Home  Mental Status: Confused (sleeping)  During Assessment patient was accompanied by: Daughter  Assessment information provided by[de-identified] Daughter  Primary Caregiver: Child  Caregiver's Name[de-identified] Yehuda Dye Dennie Millard McCoy  Caregiver's Relationship to Patient[de-identified] Family Member  Caregiver's Telephone Number[de-identified] [phone]  Support Systems: Daughter  [place] of Residence: [address] [place] [address] do you live in?: [employer] entry access options  Select all that apply : Stairs  Number of steps to enter home  : 1  Do the steps have railings?: No  Type of Current Residence: 2 story home,Other (Comment) (1st flooer set up)  Upon entering residence, is there a bedroom on the main floor (no further steps)?: Yes  Upon entering residence, is there a bathroom on the main floor (no further steps)?: Yes  In the last 12 months, was there a time when you were not able to pay the mortgage or rent on time?: No  In the last 12 months, how many places have you lived?: 2  In the last 12 months, was there a time when you did not have a steady place to sleep or slept in a shelter (including now)?: No  Living Arrangements: Lives w/ Daughter  Is patient a ?: No    Activities of Daily Living Prior to Admission  Functional Status: Assistance  Completes ADLs independently?: No  Level of ADL dependence: Assistance  Ambulates independently?: Yes  Does patient use assisted devices?: Yes  Assisted Devices (DME) used: Jesse Patino  Does patient currently own DME?: Yes  What DME does the patient currently own?: Bedside Commode,Walker,Wheelchair,Rollator  Does patient have a history of Outpatient Therapy (PT/OT)?: No  Does the patient have a history of Short-Term Rehab?: Yes [employer])  Does patient have a history of HHC?: Yes  Does patient currently have [address]?: Yes ([facility] at home)    [address] [facility]  Type of Current Home Care Services: Home [address][de-identified] Other (please enter name in comment) [facility])  [address] [facility] Provider[de-identified] PCP    Patient Information Continued  Income Source: Pension/care home  Within the past 12 months, you worried that your food would run out before you got the money to buy more : Never true  Within the past 12 months, the food you bought just didnt last and you didnt have money to get more : Never true  Does patient receive dialysis treatments?: No  Does patient have a history of substance abuse?: No  Does patient have a history of Mental Health Diagnosis?: Yes (Depression, anxiety)  Is patient receiving treatment for mental health?: Yes  Has patient received inpatient treatment related to mental health in the last 2 years?: No         Means of Transportation  Means of Transport to Appts[de-identified] Family transport  In the past 12 months, has lack of transportation kept you from medical appointments or from getting medications?: No  In the past 12 months, has lack of transportation kept you from meetings, work, or from getting things needed for daily living?: No        DISCHARGE DETAILS:    Discharge planning discussed with[de-identified] brunor and care giver, Paul Gusman of Choice: Yes     CM contacted family/caregiver?: Yes  Were Treatment Team discharge recommendations reviewed with patient/caregiver?: Yes  Did patient/caregiver verbalize understanding of patient care needs?: Yes  Were patient/caregiver advised of the risks associated with not following Treatment Team discharge recommendations?: Yes    Contacts  Patient Contacts: Joey Chaidez  Relationship to Patient[de-identified] Family  Contact Method: Phone  Phone Number: [phone]  Reason/Outcome: Discharge Planning  Spoke with patient's daughter and caregiver Darlene Norwood at [phone] and was informed patient resides with her and her sister in a 1st floor set up with steps to enter  Patient has a WC,HB, RW , rollater and BSC  Both daughter are nurses and are patient's caregivers until a room opens up at [facility]  Patient is current with [facility] with PT/OT seeing patient twice a week for each  Discipline    Patient has been to SNF rehab in the past   Discussed PT/OT recommendation for SNF rehab and Maykel Beckwith decline feeling patient will be welled care for at home with [facility] several times a week and the family providing care  Discussed risk for falls and re dmjuanis and Maykel Beckwith wishes to have patient return home  She will contact [address] [place] [address] rehab for [facility]  Family will transport patient at discharge                        Treatment Team Recommendation: Short Term Rehab  Discharge Destination Plan[de-identified] Home with [place] at Discharge : Automobile

## 2022-02-16 NOTE — ASSESSMENT & PLAN NOTE
· Started on Bactrim outpatient on Friday 2/11 but continued to have worsening AMS  · Chronic Ambrosio catheter    Follows with Urology outpatient  · Urine 2/15 (partially treated)  · UA:  Moderate amount of leukocytes, negative nitrate  · Micro:  10-20 WBC, occasional bacteria  · Not meeting SIRS/sepsis  · Ctn on IV ceftriaxone  · Urine culture pending  · Trend WBC and fever curve

## 2022-02-16 NOTE — ASSESSMENT & PLAN NOTE
· Brought in by family due to worsening cognition and agitation  · Since January patient has gradually become more confused at times but since Wednesday 2/9 patient not oriented to place or time  · CT head negative  · Diagnosed with UTI outpatient on 02/11, started on Bactrim  ED placed patient on IV ceftriaxone, continue  · Recent medication changes  · Started on gabapentin 100 mg t i d  · Started on Friday due to tremors being noted over the past couple of weeks  Being worked up for [employer]  · Transitioned from Cymbalta to Lexapro 5 mg (last week)  · Ativan 0 5 mg every 8 hours (last week)  · Started due to anxiety  Daughter believes this has made patient's agitation worse since starting  · losartan increased from 50 to 100mg (last week)  · Trazodone 25 mg to 50 mg (January)  · Per daughter confusion was noted to gradually occur in January once trazodone dose was increased  · Hold trazodone due to current somnolent exam  · Required Ativan and Zyprexa in the ED due to agitated and screaming    Patient seen after and is sleeping in bed, wakes up to stimuli and then goes back to sleep  · Continue to monitor  · Consult case management

## 2022-02-16 NOTE — PLAN OF CARE
Problem: Prexisting or High Potential for Compromised Skin Integrity  Goal: Skin integrity is maintained or improved  Description: INTERVENTIONS:  - Identify patients at risk for skin breakdown  - Assess and monitor skin integrity  - Assess and monitor nutrition and hydration status  - Monitor labs   - Assess for incontinence   - Turn and reposition patient  - Assist with mobility/ambulation  - Relieve pressure over bony prominences  - Avoid friction and shearing  - Provide appropriate hygiene as needed including keeping skin clean and dry  - Evaluate need for skin moisturizer/barrier cream  - Collaborate with interdisciplinary team   - Patient/family teaching  - Consider wound care consult   Outcome: Progressing     Problem: MOBILITY - ADULT  Goal: Maintain or return to baseline ADL function  Description: INTERVENTIONS:  -  Assess patient's ability to carry out ADLs; assess patient's baseline for ADL function and identify physical deficits which impact ability to perform ADLs (bathing, care of mouth/teeth, toileting, grooming, dressing, etc )  - Assess/evaluate cause of self-care deficits   - Assess range of motion  - Assess patient's mobility; develop plan if impaired  - Assess patient's need for assistive devices and provide as appropriate  - Encourage maximum independence but intervene and supervise when necessary  - Involve family in performance of ADLs  - Assess for home care needs following discharge   - Consider OT consult to assist with ADL evaluation and planning for discharge  - Provide patient education as appropriate  Outcome: Progressing  Goal: Maintains/Returns to pre admission functional level  Description: INTERVENTIONS:  - Perform BMAT or MOVE assessment daily    - Set and communicate daily mobility goal to care team and patient/family/caregiver  - Collaborate with rehabilitation services on mobility goals if consulted  - Perform Range of Motion 3 times a day    - Reposition patient every 3 hours   - Dangle patient 3 times a day  - Stand patient 3 times a day  - Ambulate patient 3 times a day  - Out of bed to chair 3 times a day   - Out of bed for meals 3 times a day  - Out of bed for toileting  - Record patient progress and toleration of activity level   Outcome: Progressing     Problem: Potential for Falls  Goal: Patient will remain free of falls  Description: INTERVENTIONS:  - Educate patient/family on patient safety including physical limitations  - Instruct patient to call for assistance with activity   - Consult OT/PT to assist with strengthening/mobility   - Keep Call bell within reach  - Keep bed low and locked with side rails adjusted as appropriate  - Keep care items and personal belongings within reach  - Initiate and maintain comfort rounds  - Make Fall Risk Sign visible to staff  - Offer Toileting every 2 Hours, in advance of need  - Initiate/Maintain alarm  - Obtain necessary fall risk management equipment  - Apply yellow socks and bracelet for high fall risk patients  - Consider moving patient to room near nurses station  Outcome: Progressing     Problem: PAIN - ADULT  Goal: Verbalizes/displays adequate comfort level or baseline comfort level  Description: Interventions:  - Encourage patient to monitor pain and request assistance  - Assess pain using appropriate pain scale  - Administer analgesics based on type and severity of pain and evaluate response  - Implement non-pharmacological measures as appropriate and evaluate response  - Consider cultural and social influences on pain and pain management  - Notify physician/advanced practitioner if interventions unsuccessful or patient reports new pain  Outcome: Progressing     Problem: INFECTION - ADULT  Goal: Absence or prevention of progression during hospitalization  Description: INTERVENTIONS:  - Assess and monitor for signs and symptoms of infection  - Monitor lab/diagnostic results  - Monitor all insertion sites, i e  indwelling lines, tubes, and drains  - Monitor endotracheal if appropriate and nasal secretions for changes in amount and color  - [place] appropriate cooling/warming therapies per order  - Administer medications as ordered  - Instruct and encourage patient and family to use good hand hygiene technique  - Identify and instruct in appropriate isolation precautions for identified infection/condition  Outcome: Progressing     Problem: DISCHARGE PLANNING  Goal: Discharge to home or other facility with appropriate resources  Description: INTERVENTIONS:  - Identify barriers to discharge w/patient and caregiver  - Arrange for needed discharge resources and transportation as appropriate  - Identify discharge learning needs (meds, wound care, etc )  - Arrange for interpretive services to assist at discharge as needed  - Refer to Case Management Department for coordinating discharge planning if the patient needs post-hospital services based on physician/advanced practitioner order or complex needs related to functional status, cognitive ability, or social support system  Outcome: Progressing     Problem: Knowledge Deficit  Goal: Patient/family/caregiver demonstrates understanding of disease process, treatment plan, medications, and discharge instructions  Description: Complete learning assessment and assess knowledge base    Interventions:  - Provide teaching at level of understanding  - Provide teaching via preferred learning methods  Outcome: Progressing     Problem: NEUROSENSORY - ADULT  Goal: Achieves stable or improved neurological status  Description: INTERVENTIONS  - Monitor and report changes in neurological status  - Monitor vital signs such as temperature, blood pressure, glucose, and any other labs ordered   - Initiate measures to prevent increased intracranial pressure  - Monitor for seizure activity and implement precautions if appropriate      Outcome: Progressing  Goal: Achieves maximal functionality and self care  Description: INTERVENTIONS  - Monitor swallowing and airway patency with patient fatigue and changes in neurological status  - Encourage and assist patient to increase activity and self care     - Encourage visually impaired, hearing impaired and aphasic patients to use assistive/communication devices  Outcome: Progressing     Problem: CARDIOVASCULAR - ADULT  Goal: Maintains optimal cardiac output and hemodynamic stability  Description: INTERVENTIONS:  - Monitor I/O, vital signs and rhythm  - Monitor for S/S and trends of decreased cardiac output  - Administer and titrate ordered vasoactive medications to optimize hemodynamic stability  - Assess quality of pulses, skin color and temperature  - Assess for signs of decreased coronary artery perfusion  - Instruct patient to report change in severity of symptoms  Outcome: Progressing  Goal: Absence of cardiac dysrhythmias or at baseline rhythm  Description: INTERVENTIONS:  - Continuous cardiac monitoring, vital signs, obtain 12 lead EKG if ordered  - Administer antiarrhythmic and heart rate control medications as ordered  - Monitor electrolytes and administer replacement therapy as ordered  Outcome: Progressing     Problem: RESPIRATORY - ADULT  Goal: Achieves optimal ventilation and oxygenation  Description: INTERVENTIONS:  - Assess for changes in respiratory status  - Assess for changes in mentation and behavior  - Position to facilitate oxygenation and minimize respiratory effort  - Oxygen administered by appropriate delivery if ordered  - Initiate smoking cessation education as indicated  - Encourage broncho-pulmonary hygiene including cough, deep breathe, Incentive Spirometry  - Assess the need for suctioning and aspirate as needed  - Assess and instruct to report SOB or any respiratory difficulty  - Respiratory Therapy support as indicated  Outcome: Progressing     Problem: GASTROINTESTINAL - ADULT  Goal: Minimal or absence of nausea and/or vomiting  Description: INTERVENTIONS:  - Administer IV fluids if ordered to ensure adequate hydration  - Maintain NPO status until nausea and vomiting are resolved  - Nasogastric tube if ordered  - Administer ordered antiemetic medications as needed  - Provide nonpharmacologic comfort measures as appropriate  - Advance diet as tolerated, if ordered  - Consider nutrition services referral to assist patient with adequate nutrition and appropriate food choices  Outcome: Progressing  Goal: Maintains adequate nutritional intake  Description: INTERVENTIONS:  - Monitor percentage of each meal consumed  - Identify factors contributing to decreased intake, treat as appropriate  - Assist with meals as needed  - Monitor I&O, weight, and lab values if indicated  - Obtain nutrition services referral as needed  Outcome: Progressing  Goal: Oral mucous membranes remain intact  Description: INTERVENTIONS  - Assess oral mucosa and hygiene practices  - Implement preventative oral hygiene regimen  - Implement oral medicated treatments as ordered  - Initiate Nutrition services referral as needed  Outcome: Progressing     Problem: GENITOURINARY - ADULT  Goal: Maintains or returns to baseline urinary function  Description: INTERVENTIONS:  - Assess urinary function  - Encourage oral fluids to ensure adequate hydration if ordered  - Administer IV fluids as ordered to ensure adequate hydration  - Administer ordered medications as needed  - Offer frequent toileting  - Follow urinary retention protocol if ordered  Outcome: Progressing  Goal: Absence of urinary retention  Description: INTERVENTIONS:  - Assess patients ability to void and empty bladder  - Monitor I/O  - Bladder scan as needed  - Discuss with physician/AP medications to alleviate retention as needed  - Discuss catheterization for long term situations as appropriate  Outcome: Progressing  Goal: Urinary catheter remains patent  Description: INTERVENTIONS:  - Assess patency of urinary catheter  - If patient has a chronic spencer, consider changing catheter if non-functioning  - Follow guidelines for intermittent irrigation of non-functioning urinary catheter  Outcome: Progressing     Problem: METABOLIC, FLUID AND ELECTROLYTES - ADULT  Goal: Electrolytes maintained within normal limits  Description: INTERVENTIONS:  - Monitor labs and assess patient for signs and symptoms of electrolyte imbalances  - Administer electrolyte replacement as ordered  - Monitor response to electrolyte replacements, including repeat lab results as appropriate  - Instruct patient on fluid and nutrition as appropriate  Outcome: Progressing  Goal: Fluid balance maintained  Description: INTERVENTIONS:  - Monitor labs   - Monitor I/O and WT  - Instruct patient on fluid and nutrition as appropriate  - Assess for signs & symptoms of volume excess or deficit  Outcome: Progressing  Goal: Glucose maintained within target range  Description: INTERVENTIONS:  - Monitor Blood Glucose as ordered  - Assess for signs and symptoms of hyperglycemia and hypoglycemia  - Administer ordered medications to maintain glucose within target range  - Assess nutritional intake and initiate nutrition service referral as needed  Outcome: Progressing     Problem: HEMATOLOGIC - ADULT  Goal: Maintains hematologic stability  Description: INTERVENTIONS  - Assess for signs and symptoms of bleeding or hemorrhage  - Monitor labs  - Administer supportive blood products/factors as ordered and appropriate  Outcome: Progressing

## 2022-02-16 NOTE — ASSESSMENT & PLAN NOTE
· Brought in by family due to worsening cognition and agitation  · Since January patient has gradually become more confused at times but since Wednesday 2/9 patient not oriented to place or time  · CT head negative  · Diagnosed with UTI outpatient on 02/11, started on Bactrim  ED placed patient on IV ceftriaxone  · Continue Rocephin  · Recent medication changes  · Ctn on gabapentin 100 mg t i d  · Started on Friday due to tremors being noted over the past couple of weeks  Being worked up for [employer]  · Transitioned from Cymbalta to Lexapro 5 mg (last week)  · Ativan 0 5 mg every 8 hours (last week)  · Started due to anxiety  Daughter believes this has made patient's agitation worse since starting  · losartan increased from 50 to 100mg (last week)  · Trazodone 25 mg to 50 mg (January)  · Per daughter confusion was noted to gradually occur in January once trazodone dose was increased  · Hold trazodone due to current somnolent exam  · Required Ativan and Zyprexa in the ED due to agitated and screaming    Patient seen after and is sleeping in bed, wakes up to stimuli and then goes back to sleep  · Continue to monitor  · Consult case management

## 2022-02-16 NOTE — ASSESSMENT & PLAN NOTE
· Home regimen Lexapro 5 mg daily, Ativan 0 5 mg every 8 hours p r n, trazodone 50 mg HS  · Hold home Ativan and trazodone

## 2022-02-16 NOTE — PROGRESS NOTES
[place]  Progress Note - Almaz Jefferson 1933, 80 y o  female MRN: [id number]  Unit/Bed#: -[facility] Encounter: [id number]  Primary Care Provider: Kush Randall MD   Date and time admitted to hospital: 2/15/2022  3:00 PM    Anxiety and depression  Assessment & Plan  · Home regimen Lexapro 5 mg daily, Ativan 0 5 mg every 8 hours p r n, trazodone 50 mg HS  · Hold home Ativan and trazodone    Essential hypertension  Assessment & Plan  · Home regimen Norvasc 10 mg daily, losartan 100 mg daily  · Monitor vitals as per protocol    RYANNE (acute kidney injury) ([facility] [address] )  Assessment & Plan  · Creatinine 1 57 on admission   · Creatinine from 1 year ago was about 1 0  · Received 1 L bolus of fluids and is now on maintenance fluids   · Creatinine this morning is 1 3  · On IV fluids  · Avoid hypotension/nephrotoxins medications    Left arm pain  Assessment & Plan  · Chronic left arm/neck pain since fall in March 2021  At that time no fractures noted   · Over the past year daughter reports intermittent pain and swelling to the area that is treated with hydrocodone and nerve block  Was not successful this time  · Denies recent fall or injury to the area  · Manage pain   · PT/OT consult    UTI (urinary tract infection)  Assessment & Plan  · Started on Bactrim outpatient on Friday 2/11 but continued to have worsening AMS  · Chronic Ambrosio catheter    Follows with Urology outpatient  · Urine 2/15 (partially treated)  · UA:  Moderate amount of leukocytes, negative nitrate  · Micro:  10-20 WBC, occasional bacteria  · Not meeting SIRS/sepsis  · Ctn on IV ceftriaxone  · Urine culture pending  · Trend WBC and fever curve    * Acute metabolic encephalopathy  Assessment & Plan  · Brought in by family due to worsening cognition and agitation  · Since January patient has gradually become more confused at times but since Wednesday 2/9 patient not oriented to place or time  · CT head negative  · Diagnosed with UTI outpatient on 02/11, started on Bactrim  ED placed patient on IV ceftriaxone  · Continue Rocephin  · Recent medication changes  · Ctn on gabapentin 100 mg t i d  · Started on Friday due to tremors being noted over the past couple of weeks  Being worked up for [employer]  · Transitioned from Cymbalta to Lexapro 5 mg (last week)  · Ativan 0 5 mg every 8 hours (last week)  · Started due to anxiety  Daughter believes this has made patient's agitation worse since starting  · losartan increased from 50 to 100mg (last week)  · Trazodone 25 mg to 50 mg (January)  · Per daughter confusion was noted to gradually occur in January once trazodone dose was increased  · Hold trazodone due to current somnolent exam  · Required Ativan and Zyprexa in the ED due to agitated and screaming  Patient seen after and is sleeping in bed, wakes up to stimuli and then goes back to sleep  · Continue to monitor  · Consult case management        Labs & Imaging: I have personally reviewed pertinent reports  VTE Prophylaxis: in place  Code Status:   Level 1 - Full Code    Patient Centered Rounds: I have performed bedside rounds with nursing staff today  Discussions with Specialists or Other Care Team Provider: CM    Education and Discussions with Family / Patient:  Daughter Vanessa Mcnair    Current Length of Stay: 1 day(s)    Current Patient Status: Inpatient   Certification Statement: The patient will continue to require additional inpatient hospital stay due to see my assessment and plan  Subjective:   Patient is seen and examined at bedside  Patient mentation is back to baseline  Denies any new complaints  Pain is well controlled  Afebrile  All other ROS are negative  Objective:    Vitals: Blood pressure 164/59, pulse 73, temperature 98 2 °F (36 8 °C), resp  rate 18, height 5' 7" (1 702 m), weight 115 kg (254 lb 3 1 oz), SpO2 96 %  ,Body mass index is 39 81 kg/m²    SPO2 RA Rest      ED to Hosp-Admission (Current) from 2/15/2022 in [address] Med Surg Unit   SpO2 96 %   SpO2 Activity At Rest   O2 Device None (Room air)   O2 Flow Rate --        I&O:     Intake/Output Summary (Last 24 hours) at 2/16/2022 1251  Last data filed at 2/16/2022 9073  Gross per 24 hour   Intake 1050 ml   Output 1550 ml   Net -500 ml       Physical Exam:    General- Alert, lying comfortably in bed  Not in any acute distress  Neck- Supple, No JVD  CVS- regular, S1 and S2 normal  Chest- Bilateral Air entry, No rhochi, crackles or wheezing present  Abdomen- soft, nontender, not distended, no guarding or rigidity, BS+  Extremities-  No pedal edema, No calf tenderness  CNS-   Alert, awake and orientedx3  No focal deficits present  Invasive Devices  Report    Peripheral Intravenous Line            Peripheral IV 02/16/22 Dorsal (posterior); Right Wrist <1 day          Drain            Urethral Catheter 18 Fr  <1 day                      Social History  reviewed  History reviewed  No pertinent family history   reviewed    Meds:  Current Facility-Administered Medications   Medication Dose Route Frequency Provider Last Rate Last Admin    acetaminophen (TYLENOL) tablet 650 mg  650 mg Oral Q6H PRN Rosita Shearer PA-C        amLODIPine (NORVASC) tablet 10 mg  10 mg Oral Daily Rosita Shearer PA-C   10 mg at 02/16/22 0849    aspirin chewable tablet 81 mg  81 mg Oral Daily Kate Jang PA-C   81 mg at 02/16/22 0849    atorvastatin (LIPITOR) tablet 40 mg  40 mg Oral Daily With DEE Adkins        cefTRIAXone (ROCEPHIN) IVPB (premix in dextrose) 1,000 mg 50 mL  1,000 mg Intravenous Q24H Rosita Shearer PA-C        escitalopram (LEXAPRO) tablet 5 mg  5 mg Oral Daily Kate Jang PA-C   5 mg at 02/16/22 0849    gabapentin (NEURONTIN) capsule 100 mg  100 mg Oral TID Rosita Shearer PA-C   100 mg at 02/16/22 0849    heparin (porcine) subcutaneous injection 5,000 Units  5,000 Units Subcutaneous Q8H [facility] Rosita Shearer PA-C 5,000 Units at 02/16/22 0629    loratadine (CLARITIN) tablet 10 mg  10 mg Oral Daily Santy Lantigua PA-C   10 mg at 02/16/22 0849    losartan (COZAAR) tablet 100 mg  100 mg Oral Daily Santy Lantigua PA-C   100 mg at 02/16/22 0849    ondansetron (ZOFRAN) injection 4 mg  4 mg Intravenous Q6H PRN Santy Lantigua PA-C        oxyCODONE (ROXICODONE) IR tablet 2 5 mg  2 5 mg Oral Q6H PRN Santy aLntigua PA-C   2 5 mg at 02/16/22 1034    pantoprazole (PROTONIX) EC tablet 40 mg  40 mg Oral Daily Kate Jang PA-C   40 mg at 02/16/22 0849    sodium chloride 0 9 % infusion  75 mL/hr Intravenous Continuous Santy Lantigua PA-C 75 mL/hr at 02/16/22 0646 75 mL/hr at 02/16/22 0646      Medications Prior to Admission   Medication    amLODIPine (NORVASC) 10 mg tablet    aspirin 81 mg chewable tablet    cetirizine (ZyrTEC) 10 mg tablet    cholecalciferol (VITAMIN D3) 1,000 units tablet    Cranberry (ELLURA PO)    cyanocobalamin (VITAMIN B-12) 500 MCG tablet    escitalopram (LEXAPRO) 5 mg tablet    gabapentin (NEURONTIN) 100 mg capsule    HYDROcodone-acetaminophen (NORCO) 5-325 mg per tablet    losartan (COZAAR) 50 mg tablet    nystatin (MYCOSTATIN) cream    nystatin (MYCOSTATIN) powder    pantoprazole (PROTONIX) 40 mg tablet    rosuvastatin (CRESTOR) 20 MG tablet    traZODone (DESYREL) 50 mg tablet    LORazepam (ATIVAN) 0 5 mg tablet       Labs:  Results from last 7 days   Lab Units 02/16/22  0412 02/15/22  1816 02/15/22  1553   WBC Thousand/uL 8 96  --  7 21   HEMOGLOBIN g/dL 11 2*  --  11 1*   I STAT HEMOGLOBIN g/dl  --  10 9*  --    HEMATOCRIT % 35 4  --  35 9   HEMATOCRIT, ISTAT %  --  32*  --    PLATELETS Thousands/uL 260  --  283   NEUTROS PCT % 57  --  71   LYMPHS PCT % 31  --  17   MONOS PCT % 10  --  9   EOS PCT % 2  --  2     Results from last 7 days   Lab Units 02/16/22  0412 02/15/22  1816 02/15/22  1553   POTASSIUM mmol/L 5 1  --  4 7   CHLORIDE mmol/L 105  --  100   CO2 mmol/L 28  --  29   CO2, I-STAT mmol/L  --  28 --    BUN mg/dL 14  --  19   CREATININE mg/dL 1 31*  --  1 57*   CALCIUM mg/dL 9 1  --  8 8   ALK PHOS U/L  --   --  97   ALT U/L  --   --  12   AST U/L  --   --  12   GLUCOSE, ISTAT mg/dl  --  108  --      No results found for: TROPONINI, CKMB, CKTOTAL      No results found for: Myrla Haw, SPUTUMCULTUR      Imaging:  Results for orders placed during the hospital encounter of 02/15/22    XR chest portable    Narrative  CHEST    INDICATION:   sob  Status post fall  COMPARISON:  None    EXAM PERFORMED/VIEWS:  XR CHEST PORTABLE      FINDINGS:    Cardiomediastinal silhouette appears unremarkable  The lungs are clear  No pneumothorax or pleural effusion  Bilateral shoulder arthroplasties  Impression  No acute cardiopulmonary disease  Workstation performed: [id number]    No results found for this or any previous visit        Last 24 Hours Medication List:   Current Facility-Administered Medications   Medication Dose Route Frequency Provider Last Rate    acetaminophen  650 mg Oral Q6H PRN [place] Miss, PA-C      amLODIPine  10 mg Oral Daily [place] Miss, PA-C      aspirin  81 mg Oral Daily Rubina Miss, PA-C      atorvastatin  40 mg Oral Daily With Jed, PA-C      cefTRIAXone  1,000 mg Intravenous Q24H Rubina Miss, PA-C      escitalopram  5 mg Oral Daily [place] Miss, PA-C      gabapentin  100 mg Oral TID [place] Miss, PA-C      heparin (porcine)  5,000 Units Subcutaneous [facility] Rubina Miss, PA-C      loratadine  10 mg Oral Daily [place] Miss, PA-C      losartan  100 mg Oral Daily [place] Miss, PA-C      ondansetron  4 mg Intravenous Q6H PRN [place] Miss, PA-C      oxyCODONE  2 5 mg Oral Q6H PRN [place] Miss, PA-C      pantoprazole  40 mg Oral Daily [place] Miss, PA-C      sodium chloride  75 mL/hr Intravenous Continuous [place] Miss, PA-C 75 mL/hr (02/16/22 8075)        Today, Patient Was Seen By: Melody Barry MD    ** Please Note: Dictation voice to text software may have been used in the creation of this document   **

## 2022-02-16 NOTE — ASSESSMENT & PLAN NOTE
· Started on Bactrim outpatient on Friday 2/11 but continued to have worsening AMS  · Chronic Ambrosio catheter    Follows with Urology outpatient  · Urine 2/15 (partially treated)  · UA:  Moderate amount of leukocytes, negative nitrate  · Micro:  10-20 WBC, occasional bacteria  · Not meeting SIRS/sepsis  · Started on IV ceftriaxone, continue  · Urine culture pending

## 2022-02-16 NOTE — ASSESSMENT & PLAN NOTE
· Home regimen Norvasc 10 mg daily, losartan 100 mg daily  · /63 through 172/100 in the ED  · Continue to monitor

## 2022-02-16 NOTE — PHYSICAL THERAPY NOTE
PHYSICAL THERAPY Evaluation    Performed at least 2 patient identifiers during session:  Patient Active Problem List   Diagnosis    Left upper extremity numbness    Shoulder dislocation    Weakness    UTI (urinary tract infection)    Lumbar pain    Altered mental status    Overactive bladder    Absent kidney    Obstructive sleep apnea syndrome    Acute metabolic encephalopathy    Left arm pain    RYANNE (acute kidney injury) ([facility] [address] [facility]    Essential hypertension    Anxiety and depression    Hyponatremia       Past Medical History:   Diagnosis Date    Arthritis     Psoriatic    Cancer of kidney, right ([facility] [address] )     COVID-19 12/09/2021    Hypertension     Renal disorder        Past Surgical History:   Procedure Laterality Date    HYSTERECTOMY      JOINT REPLACEMENT Bilateral     NEPHRECTOMY Right 02/07/2006    TOTAL SHOULDER REPLACEMENT Bilateral 02/14/2020 02/16/22 1159   PT Last Visit   PT Visit Date 02/16/22   Note Type   Note type Evaluation   Home Living   Type of [address] [place] [address] Two level; Able to live on main level with bedroom/bathroom   Home Equipment   (rollator)   Additional Comments pt unable to provide consistent accurate PLOF/background information  Per chart review: In January 2022 patient moved out of [address] [place] [address] to live with her daughter due to facility not having assisted care living  Patient had been living in independent living but due to intermittent periods of confusion and decreased ability to take care of herself it was deemed unsafe    Daughter has noticed confusion worsening since home over the past 6 weeks but reports this drastically changed 1 week ago;     Prior Function   Level of [place] Needs assistance with ADLs and functional mobility   Lives With Daughter   Comments unable to accurately assess home set up or level of assist;  per chart review, pt has been living with daughter since January 2022  Pt states she has a walker and a WC but unable to state how she gets around at home   General   Additional Pertinent History Assisted pt calling daughter at end of session   Family/Caregiver Present No   Cognition   Overall Cognitive Status Impaired   Arousal/Participation   (irritable)   Orientation Level Oriented to person;Oriented to place  ("My daughter's wouldn't sent me to a place like this ")   Memory Decreased recall of precautions;Decreased recall of recent events;Decreased short term memory;Decreased long term memory;Decreased recall of biographical information   Following Commands Follows one step commands inconsistently   Comments pt states, "My daughter wouldn't send me to a place like this "  When asked if she knew where she is pt states, "the hospital "  When asked why she is here pt states, "I don't know "  Pt unaware of events leading to ambulance and hospital admission   Subjective   Subjective pt states, "I can't" before trying tasks  RUE Assessment   RUE Assessment WFL   LUE Assessment   LUE Assessment   (shoulder deficits, chronic)   RLE Assessment   RLE Assessment   (grossly 2-/5 at hip flexion, 3-/5 at knee extension, 3/5 df )   LLE Assessment   LLE Assessment   (grossly 2-/5 at hip flexion, 3-/5 at knee extension, 3/5 df )   Bed Mobility   Rolling R 2  Maximal assistance   Additional items Assist x 1;Bedrails; Increased time required;LE management   Rolling L 2  Maximal assistance   Additional items Assist x 1;Bedrails; Increased time required;LE management   Supine to Sit 2  Maximal assistance   Additional items Assist x 2; Increased time required;LE management;HOB elevated; Bedrails  (poor effort from pt)   Sit to Supine 2  Maximal assistance   Additional items Assist x 2;LE management;Verbal cues  (poor effort from pt)   Transfers   Sit to Stand Unable to assess  (pt states, "I can't" without attempting, returns to supine)   Balance   Static Sitting Poor   Activity Tolerance   Activity Tolerance Patient limited by fatigue;Treatment limited secondary to agitation   Assessment   Prognosis Guarded   Problem List Decreased strength;Decreased range of motion; Impaired balance;Decreased endurance;Decreased mobility; Decreased coordination;Decreased cognition; Impaired judgement;Decreased safety awareness; Obesity   Assessment Pt is an 80 y o  female who presented to ED 2/15/22 with c/o chronic back pain, tired, hand swelling, confusion  Dx:  Dehydration, encephalopathy, agitation, UTI  Comorbidities affecting pt's physical performance at time of assessment include: Shoulder replacement, confusion, COVID  Personal factors affecting pt at time of IE include: confusion, obesity, difficulty using LUE/shoulder, self limiting  PLOF and home set up listed above, unable to fully assess;  concerns for return home include but are not limited to physical assist x 2, possible steps/elevations  Upon evaluation: Pt requires max A x 2 for bed mobility, unable to stand  Full objective findings from PT assessment regarding body systems outlined above  Current limitations include impaired balance, decreased endurance/activity tolerance, decreased BUE and BLE strength and AROM, unable to stand, confusion  The following objective measures performed on IE also reveal limitations: agitation  Pt's clinical presentation is currently unstable/unpredictable seen in pt's presentation of continuous monitoring, fall risk, and confusion  Pt would benefit from continued PT while in hospital and follow up with inpt rehab at D/C to increase strength, balance, endurance, independence with funcitonal mobility to return to PLOF, maximize independence and decrease caregiver burden and improve quality of life  The patient's AM-PAC Basic Mobility Inpatient Short Form Raw Score is 8   A Raw score of less than or equal to 17 suggests the patient may benefit from discharge to post-acute rehabilitation services  Please also refer to the recommendation of the Physical Therapist for safe discharge planning  Barriers to Discharge Decreased caregiver support; Inaccessible home environment  (unable to fully assess home set up/PLOF, cont to assess)   Goals   Patient Goals to call my daughter   STG Expiration Date 03/02/22   Short Term Goal #1 1  S for sit to/from supine;  2   S for sit to/from standing with RW;  3   S to ambulate 50ft with RW;  4   Assess home set up and update stair goal if needed   Plan   Treatment/Interventions ADL retraining;Functional transfer training;LE strengthening/ROM; Elevations; Therapeutic exercise; Endurance training;Cognitive reorientation;Patient/family training;Equipment eval/education; Bed mobility;Gait training; Compensatory technique education;Continued evaluation;OT   PT Frequency 3-5x/wk   Recommendation   PT Discharge Recommendation Post acute rehabilitation services   Additional Comments pt appears below baseline based on chart review; Pt currently max A x 2 to sit EOB, unable to attempt standing     AM-PAC Basic Mobility Inpatient   Turning in Bed Without Bedrails 2   Lying on Back to Sitting on Edge of Flat Bed 2   Moving Bed to Chair 1   Standing Up From Chair 1   Walk in Room 1   Climb 3-5 Stairs 1   Basic Mobility Inpatient Raw Score 8   Turning Head Towards Sound 3   Follow Simple Instructions 3   Low Function Basic Mobility Raw Score 14   Low Function Basic Mobility Standardized Score 22 01   Highest Level Of Mobility   JH-HLM Goal 3: Sit at edge of bed   JH-HLM Highest Level of Mobility 3: Sit at edge of bed   JH-HLM Goal Achieved Yes     Beryl Taylor, PT      Patient Name: Jennifer TRUONGZ Date: 2/16/2022

## 2022-02-16 NOTE — H&P
Matt Campbell  H&P- [id number] 1933, 80 y o  female MRN: [id number]  Unit/Bed#: -01 Encounter: [id number]  Primary Care Provider: Sarah John MD   Date and time admitted to hospital: 2/15/2022  3:00 PM    * Acute metabolic encephalopathy  Assessment & Plan  · Brought in by family due to worsening cognition and agitation  · Since January patient has gradually become more confused at times but since Wednesday 2/9 patient not oriented to place or time  · CT head negative  · Diagnosed with UTI outpatient on 02/11, started on Bactrim  ED placed patient on IV ceftriaxone, continue  · Recent medication changes  · Started on gabapentin 100 mg t i d  · Started on Friday due to tremors being noted over the past couple of weeks  Being worked up for Optichron  · Transitioned from Cymbalta to Lexapro 5 mg (last week)  · Ativan 0 5 mg every 8 hours (last week)  · Started due to anxiety  Daughter believes this has made patient's agitation worse since starting  · losartan increased from 50 to 100mg (last week)  · Trazodone 25 mg to 50 mg (January)  · Per daughter confusion was noted to gradually occur in January once trazodone dose was increased  · Hold trazodone due to current somnolent exam  · Required Ativan and Zyprexa in the ED due to agitated and screaming  Patient seen after and is sleeping in bed, wakes up to stimuli and then goes back to sleep  · Continue to monitor  · Consult case management    UTI (urinary tract infection)  Assessment & Plan  · Started on Bactrim outpatient on Friday 2/11 but continued to have worsening AMS  · Chronic Ambrosio catheter    Follows with Urology outpatient  · Urine 2/15 (partially treated)  · UA:  Moderate amount of leukocytes, negative nitrate  · Micro:  10-20 WBC, occasional bacteria  · Not meeting SIRS/sepsis  · Started on IV ceftriaxone, continue  · Urine culture pending    RYANNE (acute kidney injury) ([facility] [address] )  Assessment & Plan  · Creatinine 1 57 on admission   · Creatinine from 1 year ago was about 1 0  · Received 1 L bolus of fluids and is now on maintenance fluids   · Continue to monitor    Left arm pain  Assessment & Plan  · Chronic left arm/neck pain since fall in March 2021  At that time no fractures noted   · Over the past year daughter reports intermittent pain and swelling to the area that is treated with hydrocodone and nerve block  Was not successful this time  · Denies recent fall or injury to the area  · Manage pain   · Consult PT/OT    Anxiety and depression  Assessment & Plan  · Home regimen Lexapro 5 mg daily, Ativan 0 5 mg every 8 hours p r n, trazodone 50 mg HS  · Hold home Ativan and trazodone    Essential hypertension  Assessment & Plan  · Home regimen Norvasc 10 mg daily, losartan 100 mg daily  · /63 through 172/100 in the ED  · Continue to monitor    VTE Pharmacologic Prophylaxis: VTE Score: 4 Moderate Risk (Score 3-4) - Pharmacological DVT Prophylaxis Ordered: heparin  Code Status: Level 1 - Full Code   Discussion with family: Updated  (daughter) at bedside  Anticipated Length of Stay: Patient will be admitted on an inpatient basis with an anticipated length of stay of greater than 2 midnights secondary to Acute metabolic encephalopathy  Total Time for Visit, including Counseling / Coordination of Care: 60 minutes Greater than 50% of this total time spent on direct patient counseling and coordination of care  Chief Complaint:  Confusion    History of Present Illness:  Sindi Lopez is a 80 y o  female with a PMH of chronic left arm/neck pain, anxiety/depression, hypertension who presents with worsening confusion/agitation and increase in chronic pain on her left arm  In January 2022 patient moved out of [address] [place] [address] in [place] to live with her daughter due to facility not having assisted care living    Patient had been living in independent living but due to intermittent periods of confusion and decreased ability to take care of herself it was deemed unsafe  Daughter has noticed confusion worsening since home over the past 6 weeks but reports this drastically changed 1 week ago  Patient currently only oriented to self not place or time  Last week at doctor's appointments patient's medications were altered  She was started on gabapentin for tremors potentially due to Parkinson's, Lexapro for anxiety/depression, Ativan for anxiety and losartan was increased from 50 mg to 100 mg  Patient was also diagnosed with a UTI infection on 2/11  Patient was started on Bactrim  Daughter reports compliance to new medications and antibiotic however she has noticed worsening agitation and confusion  Daughter feels that antibiotic was not affective and that Ativan was actually worsening her behavior  Daughter is also concerned that her trazodone medication is causing her confusion  Trazodone had been increased from 25 mg to 50 mg in early January which is when confusion was initially noted  Prior to seeing patient in the ED patient was noted to be agitated and screaming out  Had required Ativan and Zyprexa  Patient is now somnolent in bed  Wakes up to stimuli but then goes back to bed  Vital stable  Daughter wishes for patient to hopefully get better with antibiotics over the next couple of days so that she can return home to them  Patient has placement at [facility] [address] in their assisted living in 2 weeks  Review of Systems:  Review of Systems   Unable to perform ROS: Mental status change       Past Medical and Surgical History:   History reviewed  No pertinent past medical history  Past Surgical History:   Procedure Laterality Date    NEPHRECTOMY Right 02/07/2006    TOTAL SHOULDER REPLACEMENT Bilateral 02/14/2020       Meds/Allergies:  Prior to Admission medications    Medication Sig Start Date End Date Taking?  Authorizing Provider   aspirin 81 mg chewable tablet Chew 81 mg daily   Yes Historical Provider, MD   cetirizine (ZyrTEC) 10 mg tablet Take 10 mg by mouth daily   Yes Historical Provider, MD   escitalopram (LEXAPRO) 5 mg tablet Take 5 mg by mouth daily   Yes Historical Provider, MD   gabapentin (NEURONTIN) 100 mg capsule Take 100 mg by mouth 3 (three) times a day   Yes Historical Provider, MD   LORazepam (ATIVAN) 0 5 mg tablet Take 0 5 mg by mouth every 8 (eight) hours as needed for anxiety   Yes Historical Provider, MD   pantoprazole (PROTONIX) 40 mg tablet Take 40 mg by mouth daily   Yes Historical Provider, MD   amLODIPine (NORVASC) 10 mg tablet Take 10 mg by mouth daily    Historical Provider, MD   losartan (COZAAR) 50 mg tablet Take 100 mg by mouth daily    Historical Provider, MD   rosuvastatin (CRESTOR) 20 MG tablet Take 20 mg by mouth daily    Historical Provider, MD   traZODone (DESYREL) 50 mg tablet Take 50 mg by mouth daily    Historical Provider, MD   pantoprazole (PROTONIX) 40 mg injection Infuse 40 mg into a venous catheter daily  2/15/22  Historical Provider, MD     I have reviewed home medications with patient family member  Allergies: No Known Allergies    Social History:  Marital Status:    Occupation:  Unknown  Patient Pre-hospital Living Situation: Home  Patient Pre-hospital Level of Mobility: walks with walker  Patient Pre-hospital Diet Restrictions:  Regular  Substance Use History:   Social History     Substance and Sexual Activity   Alcohol Use Not Currently     Social History     Tobacco Use   Smoking Status Never Smoker   Smokeless Tobacco Never Used     Social History     Substance and Sexual Activity   Drug Use Not Currently       Family History:  History reviewed  No pertinent family history      Physical Exam:     Vitals:   Blood Pressure: (!) 172/62 (02/15/22 2257)  Pulse: 83 (02/15/22 2257)  Temperature: 98 7 °F (37 1 °C) (02/15/22 2257)  Temp Source: Axillary (02/15/22 2257)  Respirations: 18 (02/15/22 2257)  Height: 5' 7" (170 2 cm) (02/15/22 1501)  Weight - Scale: 117 kg (258 lb 9 6 oz) (02/15/22 2230)  SpO2: 97 % (02/15/22 2257)    Physical Exam  Vitals and nursing note reviewed  Constitutional:       General: She is sleeping  Appearance: Normal appearance  She is obese  HENT:      Head: Normocephalic  Eyes:      Extraocular Movements: Extraocular movements intact  Pupils: Pupils are equal, round, and reactive to light  Cardiovascular:      Rate and Rhythm: Normal rate and regular rhythm  Heart sounds: No murmur heard  Pulmonary:      Effort: Pulmonary effort is normal  No respiratory distress  Breath sounds: Normal breath sounds  No wheezing  Abdominal:      General: Bowel sounds are normal  There is no distension  Tenderness: There is no abdominal tenderness  There is no guarding  Musculoskeletal:         General: Normal range of motion  Cervical back: Normal range of motion  Right lower leg: No edema  Left lower leg: No edema  Skin:     General: Skin is warm  Neurological:      General: No focal deficit present  Mental Status: She is easily aroused  Comments: Oriented to person not place or time   Psychiatric:         Mood and Affect: Mood normal          Behavior: Behavior normal          Thought Content:  Thought content normal           Additional Data:     Lab Results:  Results from last 7 days   Lab Units 02/15/22  1816 02/15/22  1553   WBC Thousand/uL  --  7 21   HEMOGLOBIN g/dL  --  11 1*   I STAT HEMOGLOBIN g/dl 10 9*  --    HEMATOCRIT %  --  35 9   HEMATOCRIT, ISTAT % 32*  --    PLATELETS Thousands/uL  --  283   NEUTROS PCT %  --  71   LYMPHS PCT %  --  17   MONOS PCT %  --  9   EOS PCT %  --  2     Results from last 7 days   Lab Units 02/15/22  1816 02/15/22  1553   SODIUM mmol/L  --  134*   POTASSIUM mmol/L  --  4 7   CHLORIDE mmol/L  --  100   CO2 mmol/L  --  29   CO2, I-STAT mmol/L 28  --    BUN mg/dL  --  19   CREATININE mg/dL  --  1 57*   ANION GAP mmol/L  --  5   CALCIUM mg/dL  --  8 8   ALBUMIN g/dL  --  2 8*   TOTAL BILIRUBIN mg/dL  --  0 20   ALK PHOS U/L  --  97   ALT U/L  --  12   AST U/L  --  12   GLUCOSE RANDOM mg/dL  --  122                       Imaging: Reviewed radiology reports from this admission including: xray(s)  XR chest portable   Final Result by Vicki Gay MD (02/15 1626)      No acute cardiopulmonary disease  Workstation performed: [id number]             ** Please Note: This note has been constructed using a voice recognition system   **

## 2022-02-17 ENCOUNTER — APPOINTMENT (INPATIENT)
Dept: CT IMAGING | Facility: HOSPITAL | Age: 87
DRG: 689 | End: 2022-02-17
Payer: MEDICARE

## 2022-02-17 LAB
ANION GAP SERPL CALCULATED.3IONS-SCNC: 6 MMOL/L (ref 4–13)
ATRIAL RATE: 81 BPM
BASOPHILS # BLD AUTO: 0.06 THOUSANDS/ΜL (ref 0–0.1)
BASOPHILS NFR BLD AUTO: 1 % (ref 0–1)
BUN SERPL-MCNC: 19 MG/DL (ref 5–25)
CALCIUM SERPL-MCNC: 8.6 MG/DL (ref 8.3–10.1)
CHLORIDE SERPL-SCNC: 107 MMOL/L (ref 100–108)
CO2 SERPL-SCNC: 29 MMOL/L (ref 21–32)
CREAT SERPL-MCNC: 1.33 MG/DL (ref 0.6–1.3)
EOSINOPHIL # BLD AUTO: 0.38 THOUSAND/ΜL (ref 0–0.61)
EOSINOPHIL NFR BLD AUTO: 5 % (ref 0–6)
ERYTHROCYTE [DISTWIDTH] IN BLOOD BY AUTOMATED COUNT: 13.2 % (ref 11.6–15.1)
GFR SERPL CREATININE-BSD FRML MDRD: 35 ML/MIN/1.73SQ M
GLUCOSE SERPL-MCNC: 94 MG/DL (ref 65–140)
HCT VFR BLD AUTO: 33.7 % (ref 34.8–46.1)
HGB BLD-MCNC: 10.2 G/DL (ref 11.5–15.4)
IMM GRANULOCYTES # BLD AUTO: 0.01 THOUSAND/UL (ref 0–0.2)
IMM GRANULOCYTES NFR BLD AUTO: 0 % (ref 0–2)
LYMPHOCYTES # BLD AUTO: 2.45 THOUSANDS/ΜL (ref 0.6–4.47)
LYMPHOCYTES NFR BLD AUTO: 33 % (ref 14–44)
MAGNESIUM SERPL-MCNC: 2 MG/DL (ref 1.6–2.6)
MCH RBC QN AUTO: 28.3 PG (ref 26.8–34.3)
MCHC RBC AUTO-ENTMCNC: 30.3 G/DL (ref 31.4–37.4)
MCV RBC AUTO: 93 FL (ref 82–98)
MONOCYTES # BLD AUTO: 0.77 THOUSAND/ΜL (ref 0.17–1.22)
MONOCYTES NFR BLD AUTO: 11 % (ref 4–12)
NEUTROPHILS # BLD AUTO: 3.66 THOUSANDS/ΜL (ref 1.85–7.62)
NEUTS SEG NFR BLD AUTO: 50 % (ref 43–75)
NRBC BLD AUTO-RTO: 0 /100 WBCS
P AXIS: 47 DEGREES
PLATELET # BLD AUTO: 296 THOUSANDS/UL (ref 149–390)
PMV BLD AUTO: 8.6 FL (ref 8.9–12.7)
POTASSIUM SERPL-SCNC: 4.5 MMOL/L (ref 3.5–5.3)
PR INTERVAL: 192 MS
QRS AXIS: -52 DEGREES
QRSD INTERVAL: 134 MS
QT INTERVAL: 408 MS
QTC INTERVAL: 473 MS
RBC # BLD AUTO: 3.61 MILLION/UL (ref 3.81–5.12)
SODIUM SERPL-SCNC: 142 MMOL/L (ref 136–145)
T WAVE AXIS: 88 DEGREES
VENTRICULAR RATE: 81 BPM
WBC # BLD AUTO: 7.33 THOUSAND/UL (ref 4.31–10.16)

## 2022-02-17 PROCEDURE — 85025 COMPLETE CBC W/AUTO DIFF WBC: CPT | Performed by: INTERNAL MEDICINE

## 2022-02-17 PROCEDURE — 83735 ASSAY OF MAGNESIUM: CPT | Performed by: INTERNAL MEDICINE

## 2022-02-17 PROCEDURE — 80048 BASIC METABOLIC PNL TOTAL CA: CPT | Performed by: INTERNAL MEDICINE

## 2022-02-17 PROCEDURE — 97530 THERAPEUTIC ACTIVITIES: CPT

## 2022-02-17 PROCEDURE — 97167 OT EVAL HIGH COMPLEX 60 MIN: CPT

## 2022-02-17 PROCEDURE — 72125 CT NECK SPINE W/O DYE: CPT

## 2022-02-17 PROCEDURE — 93010 ELECTROCARDIOGRAM REPORT: CPT | Performed by: INTERNAL MEDICINE

## 2022-02-17 PROCEDURE — 99232 SBSQ HOSP IP/OBS MODERATE 35: CPT | Performed by: INTERNAL MEDICINE

## 2022-02-17 PROCEDURE — G1004 CDSM NDSC: HCPCS

## 2022-02-17 PROCEDURE — 97116 GAIT TRAINING THERAPY: CPT

## 2022-02-17 RX ORDER — OXYCODONE HYDROCHLORIDE 5 MG/1
5 TABLET ORAL EVERY 6 HOURS PRN
Status: DISCONTINUED | OUTPATIENT
Start: 2022-02-17 | End: 2022-02-18 | Stop reason: HOSPADM

## 2022-02-17 RX ORDER — LANOLIN ALCOHOL/MO/W.PET/CERES
3 CREAM (GRAM) TOPICAL
Status: DISCONTINUED | OUTPATIENT
Start: 2022-02-17 | End: 2022-02-18 | Stop reason: HOSPADM

## 2022-02-17 RX ADMIN — ACETAMINOPHEN 650 MG: 325 TABLET, FILM COATED ORAL at 11:20

## 2022-02-17 RX ADMIN — LORATADINE 10 MG: 10 TABLET ORAL at 08:54

## 2022-02-17 RX ADMIN — OXYCODONE HYDROCHLORIDE 2.5 MG: 5 TABLET ORAL at 14:52

## 2022-02-17 RX ADMIN — GABAPENTIN 100 MG: 100 CAPSULE ORAL at 21:19

## 2022-02-17 RX ADMIN — Medication 3 MG: at 21:25

## 2022-02-17 RX ADMIN — GABAPENTIN 100 MG: 100 CAPSULE ORAL at 08:54

## 2022-02-17 RX ADMIN — HEPARIN SODIUM 5000 UNITS: 5000 INJECTION, SOLUTION INTRAVENOUS; SUBCUTANEOUS at 13:12

## 2022-02-17 RX ADMIN — GABAPENTIN 100 MG: 100 CAPSULE ORAL at 16:28

## 2022-02-17 RX ADMIN — OXYCODONE HYDROCHLORIDE 2.5 MG: 5 TABLET ORAL at 08:55

## 2022-02-17 RX ADMIN — ATORVASTATIN CALCIUM 40 MG: 40 TABLET, FILM COATED ORAL at 16:28

## 2022-02-17 RX ADMIN — PANTOPRAZOLE SODIUM 40 MG: 40 TABLET, DELAYED RELEASE ORAL at 08:54

## 2022-02-17 RX ADMIN — OXYCODONE HYDROCHLORIDE 5 MG: 5 TABLET ORAL at 21:19

## 2022-02-17 RX ADMIN — AMLODIPINE BESYLATE 10 MG: 5 TABLET ORAL at 08:54

## 2022-02-17 RX ADMIN — ESCITALOPRAM 5 MG: 5 TABLET, FILM COATED ORAL at 08:54

## 2022-02-17 RX ADMIN — LOSARTAN POTASSIUM 100 MG: 50 TABLET, FILM COATED ORAL at 08:53

## 2022-02-17 RX ADMIN — ASPIRIN 81 MG CHEWABLE TABLET 81 MG: 81 TABLET CHEWABLE at 08:54

## 2022-02-17 RX ADMIN — CEFTRIAXONE 1000 MG: 1 INJECTION, SOLUTION INTRAVENOUS at 21:19

## 2022-02-17 RX ADMIN — HEPARIN SODIUM 5000 UNITS: 5000 INJECTION, SOLUTION INTRAVENOUS; SUBCUTANEOUS at 21:19

## 2022-02-17 RX ADMIN — HEPARIN SODIUM 5000 UNITS: 5000 INJECTION, SOLUTION INTRAVENOUS; SUBCUTANEOUS at 05:21

## 2022-02-17 NOTE — PHYSICAL THERAPY NOTE
Physical Therapy Cancellation Note    Off floor for CT C-spine  Will continue to follow  Heydi Spivey, PT

## 2022-02-17 NOTE — PLAN OF CARE
Problem: Prexisting or High Potential for Compromised Skin Integrity  Goal: Skin integrity is maintained or improved  Description: INTERVENTIONS:  - Identify patients at risk for skin breakdown  - Assess and monitor skin integrity  - Assess and monitor nutrition and hydration status  - Monitor labs   - Assess for incontinence   - Turn and reposition patient  - Assist with mobility/ambulation  - Relieve pressure over bony prominences  - Avoid friction and shearing  - Provide appropriate hygiene as needed including keeping skin clean and dry  - Evaluate need for skin moisturizer/barrier cream  - Collaborate with interdisciplinary team   - Patient/family teaching  - Consider wound care consult   Outcome: Progressing     Problem: MOBILITY - ADULT  Goal: Maintain or return to baseline ADL function  Description: INTERVENTIONS:  -  Assess patient's ability to carry out ADLs; assess patient's baseline for ADL function and identify physical deficits which impact ability to perform ADLs (bathing, care of mouth/teeth, toileting, grooming, dressing, etc )  - Assess/evaluate cause of self-care deficits   - Assess range of motion  - Assess patient's mobility; develop plan if impaired  - Assess patient's need for assistive devices and provide as appropriate  - Encourage maximum independence but intervene and supervise when necessary  - Involve family in performance of ADLs  - Assess for home care needs following discharge   - Consider OT consult to assist with ADL evaluation and planning for discharge  - Provide patient education as appropriate  Outcome: Progressing  Goal: Maintains/Returns to pre admission functional level  Description: INTERVENTIONS:  - Perform BMAT or MOVE assessment daily    - Set and communicate daily mobility goal to care team and patient/family/caregiver  - Collaborate with rehabilitation services on mobility goals if consulted  - Perform Range of Motion  times a day    - Reposition patient every hours   - Dangle patient  times a day  - Stand patient  times a day  - Ambulate patient  times a day  - Out of bed to chair  times a day   - Out of bed for meals  times a day  - Out of bed for toileting  - Record patient progress and toleration of activity level   Outcome: Progressing     Problem: Potential for Falls  Goal: Patient will remain free of falls  Description: INTERVENTIONS:  - Educate patient/family on patient safety including physical limitations  - Instruct patient to call for assistance with activity   - Consult OT/PT to assist with strengthening/mobility   - Keep Call bell within reach  - Keep bed low and locked with side rails adjusted as appropriate  - Keep care items and personal belongings within reach  - Initiate and maintain comfort rounds  - Make Fall Risk Sign visible to staff  - Offer Toileting every  Hours, in advance of need  - Initiate/Maintain alarm  - Obtain necessary fall risk management equipmen  - Apply yellow socks and bracelet for high fall risk patients  - Consider moving patient to room near nurses station  Outcome: Progressing     Problem: PAIN - ADULT  Goal: Verbalizes/displays adequate comfort level or baseline comfort level  Description: Interventions:  - Encourage patient to monitor pain and request assistance  - Assess pain using appropriate pain scale  - Administer analgesics based on type and severity of pain and evaluate response  - Implement non-pharmacological measures as appropriate and evaluate response  - Consider cultural and social influences on pain and pain management  - Notify physician/advanced practitioner if interventions unsuccessful or patient reports new pain  Outcome: Progressing     Problem: INFECTION - ADULT  Goal: Absence or prevention of progression during hospitalization  Description: INTERVENTIONS:  - Assess and monitor for signs and symptoms of infection  - Monitor lab/diagnostic results  - Monitor all insertion sites, i e  indwelling lines, tubes, and drains  - Monitor endotracheal if appropriate and nasal secretions for changes in amount and color  - [place] appropriate cooling/warming therapies per order  - Administer medications as ordered  - Instruct and encourage patient and family to use good hand hygiene technique  - Identify and instruct in appropriate isolation precautions for identified infection/condition  Outcome: Progressing     Problem: DISCHARGE PLANNING  Goal: Discharge to home or other facility with appropriate resources  Description: INTERVENTIONS:  - Identify barriers to discharge w/patient and caregiver  - Arrange for needed discharge resources and transportation as appropriate  - Identify discharge learning needs (meds, wound care, etc )  - Arrange for interpretive services to assist at discharge as needed  - Refer to Case Management Department for coordinating discharge planning if the patient needs post-hospital services based on physician/advanced practitioner order or complex needs related to functional status, cognitive ability, or social support system  Outcome: Progressing     Problem: Knowledge Deficit  Goal: Patient/family/caregiver demonstrates understanding of disease process, treatment plan, medications, and discharge instructions  Description: Complete learning assessment and assess knowledge base    Interventions:  - Provide teaching at level of understanding  - Provide teaching via preferred learning methods  Outcome: Progressing     Problem: NEUROSENSORY - ADULT  Goal: Achieves stable or improved neurological status  Description: INTERVENTIONS  - Monitor and report changes in neurological status  - Monitor vital signs such as temperature, blood pressure, glucose, and any other labs ordered   - Initiate measures to prevent increased intracranial pressure  - Monitor for seizure activity and implement precautions if appropriate      Outcome: Progressing  Goal: Achieves maximal functionality and self care  Description: INTERVENTIONS  - Monitor swallowing and airway patency with patient fatigue and changes in neurological status  - Encourage and assist patient to increase activity and self care     - Encourage visually impaired, hearing impaired and aphasic patients to use assistive/communication devices  Outcome: Progressing     Problem: CARDIOVASCULAR - ADULT  Goal: Maintains optimal cardiac output and hemodynamic stability  Description: INTERVENTIONS:  - Monitor I/O, vital signs and rhythm  - Monitor for S/S and trends of decreased cardiac output  - Administer and titrate ordered vasoactive medications to optimize hemodynamic stability  - Assess quality of pulses, skin color and temperature  - Assess for signs of decreased coronary artery perfusion  - Instruct patient to report change in severity of symptoms  Outcome: Progressing  Goal: Absence of cardiac dysrhythmias or at baseline rhythm  Description: INTERVENTIONS:  - Continuous cardiac monitoring, vital signs, obtain 12 lead EKG if ordered  - Administer antiarrhythmic and heart rate control medications as ordered  - Monitor electrolytes and administer replacement therapy as ordered  Outcome: Progressing     Problem: RESPIRATORY - ADULT  Goal: Achieves optimal ventilation and oxygenation  Description: INTERVENTIONS:  - Assess for changes in respiratory status  - Assess for changes in mentation and behavior  - Position to facilitate oxygenation and minimize respiratory effort  - Oxygen administered by appropriate delivery if ordered  - Initiate smoking cessation education as indicated  - Encourage broncho-pulmonary hygiene including cough, deep breathe, Incentive Spirometry  - Assess the need for suctioning and aspirate as needed  - Assess and instruct to report SOB or any respiratory difficulty  - Respiratory Therapy support as indicated  Outcome: Progressing     Problem: GASTROINTESTINAL - ADULT  Goal: Minimal or absence of nausea and/or vomiting  Description: INTERVENTIONS:  - Administer IV fluids if ordered to ensure adequate hydration  - Maintain NPO status until nausea and vomiting are resolved  - Nasogastric tube if ordered  - Administer ordered antiemetic medications as needed  - Provide nonpharmacologic comfort measures as appropriate  - Advance diet as tolerated, if ordered  - Consider nutrition services referral to assist patient with adequate nutrition and appropriate food choices  Outcome: Progressing  Goal: Maintains adequate nutritional intake  Description: INTERVENTIONS:  - Monitor percentage of each meal consumed  - Identify factors contributing to decreased intake, treat as appropriate  - Assist with meals as needed  - Monitor I&O, weight, and lab values if indicated  - Obtain nutrition services referral as needed  Outcome: Progressing  Goal: Oral mucous membranes remain intact  Description: INTERVENTIONS  - Assess oral mucosa and hygiene practices  - Implement preventative oral hygiene regimen  - Implement oral medicated treatments as ordered  - Initiate Nutrition services referral as needed  Outcome: Progressing     Problem: GENITOURINARY - ADULT  Goal: Maintains or returns to baseline urinary function  Description: INTERVENTIONS:  - Assess urinary function  - Encourage oral fluids to ensure adequate hydration if ordered  - Administer IV fluids as ordered to ensure adequate hydration  - Administer ordered medications as needed  - Offer frequent toileting  - Follow urinary retention protocol if ordered  Outcome: Progressing  Goal: Absence of urinary retention  Description: INTERVENTIONS:  - Assess patients ability to void and empty bladder  - Monitor I/O  - Bladder scan as needed  - Discuss with physician/AP medications to alleviate retention as needed  - Discuss catheterization for long term situations as appropriate  Outcome: Progressing  Goal: Urinary catheter remains patent  Description: INTERVENTIONS:  - Assess patency of urinary catheter  - If patient has a chronic spencer, consider changing catheter if non-functioning  - Follow guidelines for intermittent irrigation of non-functioning urinary catheter  Outcome: Progressing     Problem: METABOLIC, FLUID AND ELECTROLYTES - ADULT  Goal: Electrolytes maintained within normal limits  Description: INTERVENTIONS:  - Monitor labs and assess patient for signs and symptoms of electrolyte imbalances  - Administer electrolyte replacement as ordered  - Monitor response to electrolyte replacements, including repeat lab results as appropriate  - Instruct patient on fluid and nutrition as appropriate  Outcome: Progressing  Goal: Fluid balance maintained  Description: INTERVENTIONS:  - Monitor labs   - Monitor I/O and WT  - Instruct patient on fluid and nutrition as appropriate  - Assess for signs & symptoms of volume excess or deficit  Outcome: Progressing  Goal: Glucose maintained within target range  Description: INTERVENTIONS:  - Monitor Blood Glucose as ordered  - Assess for signs and symptoms of hyperglycemia and hypoglycemia  - Administer ordered medications to maintain glucose within target range  - Assess nutritional intake and initiate nutrition service referral as needed  Outcome: Progressing     Problem: HEMATOLOGIC - ADULT  Goal: Maintains hematologic stability  Description: INTERVENTIONS  - Assess for signs and symptoms of bleeding or hemorrhage  - Monitor labs  - Administer supportive blood products/factors as ordered and appropriate  Outcome: Progressing

## 2022-02-17 NOTE — PROGRESS NOTES
[place]  Progress Note - Marva Kuhn 1933, 80 y o  female MRN: [id number]  Unit/Bed#: -01 Encounter: [id number]  Primary Care Provider: Bart Villalpando MD   Date and time admitted to hospital: 2/15/2022  3:00 PM    Hyponatremia  Assessment & Plan  Patient admitted with mild hyponatremia sodium of 134 POA  Resolved with IV fluids    Anxiety and depression  Assessment & Plan  · Home regimen Lexapro 5 mg daily, Ativan 0 5 mg every 8 hours p r n, trazodone 50 mg HS  · Hold home Ativan and trazodone    Essential hypertension  Assessment & Plan  · Home regimen Norvasc 10 mg daily, losartan 100 mg daily  · Monitor vitals as per protocol    RYANNE (acute kidney injury) ([facility] [address] )  Assessment & Plan  · Patient admitted with acute kidney injury with creatinine of 1 57, poor oral intake and noted to have dehydration  · Creatinine 1 57 on admission   · Creatinine from 1 year ago was about 1 0  · Received 1 L bolus of fluids and is now on maintenance fluids   · Creatinine this morning is 1 3  · DC IV fluids  · Avoid hypotension/nephrotoxins medications    Left arm pain  Assessment & Plan  · Chronic left arm/neck pain since fall in March 2021  At that time no fractures noted   · Over the past year daughter reports intermittent pain and swelling to the area that is treated with hydrocodone and nerve block  Was not successful this time  · Denies recent fall or injury to the area  · Manage pain   · Daughter requested CT cervical spine  Will order CT scan C-spine  · PT/OT consult appreciated    UTI (urinary tract infection)  Assessment & Plan  · Started on Bactrim outpatient on Friday 2/11 but continued to have worsening AMS  · Chronic Ambrosio catheter    Follows with Urology outpatient  · Urine 2/15 (partially treated)  · UA:  Moderate amount of leukocytes, negative nitrate  · Micro:  10-20 WBC, occasional bacteria  · Not meeting SIRS/sepsis  · Ctn on IV ceftriaxone  · Will discontinue Ambrosio catheter and give voiding trial  · Urine culture pending  · Trend WBC and fever curve    * Acute metabolic encephalopathy  Assessment & Plan  · Brought in by family due to worsening cognition and agitation  · Since January patient has gradually become more confused at times but since Wednesday 2/9 patient not oriented to place or time  · CT head negative  · Diagnosed with UTI outpatient on 02/11, started on Bactrim  ED placed patient on IV ceftriaxone  · Continue Rocephin  · Recent medication changes  · Ctn on gabapentin 100 mg t i d  · Started on Friday due to tremors being noted over the past couple of weeks  Being worked up for [email or web address]  · Transitioned from Cymbalta to Lexapro 5 mg (last week)  · Ativan 0 5 mg every 8 hours (last week)  · Started due to anxiety  Daughter believes this has made patient's agitation worse since starting  · losartan increased from 50 to 100mg (last week)  · Trazodone 25 mg to 50 mg (January)  · Per daughter confusion was noted to gradually occur in January once trazodone dose was increased  · Hold trazodone due to current somnolent exam  · Required Ativan and Zyprexa in the ED due to agitated and screaming  Patient seen after and is sleeping in bed, wakes up to stimuli and then goes back to sleep  · Continue to monitor  · Consult case management        Labs & Imaging: I have personally reviewed pertinent reports  VTE Prophylaxis: in place  Code Status:   Level 1 - Full Code    Patient Centered Rounds: I have performed bedside rounds with nursing staff today  Discussions with Specialists or Other Care Team Provider: CM    Education and Discussions with Family / Patient:  Daughter Ben Riley    Current Length of Stay: 2 day(s)    Current Patient Status: Inpatient   Certification Statement: The patient will continue to require additional inpatient hospital stay due to see my assessment and plan       Subjective:   Patient is seen and examined at bedside  Complains of left arm pain  Denies any numbness, weakness of extremities  Afebrile  Denies any nausea, vomiting, abdominal pain  All other ROS are negative  Objective:    Vitals: Blood pressure 142/59, pulse 77, temperature 98 °F (36 7 °C), resp  rate 18, height 5' 7" (1 702 m), weight 116 kg (255 lb 15 3 oz), SpO2 95 %  ,Body mass index is 40 09 kg/m²  SPO2 RA Rest      ED to Hosp-Admission (Current) from 2/15/2022 in [address] Med Surg Unit   SpO2 95 %   SpO2 Activity At Rest   O2 Device Nasal cannula   O2 Flow Rate --        I&O:     Intake/Output Summary (Last 24 hours) at 2/17/2022 1122  Last data filed at 2/17/2022 1001  Gross per 24 hour   Intake 370 ml   Output 4500 ml   Net -4130 ml       Physical Exam:    General- Alert, lying comfortably in bed  Not in any acute distress  Neck- Supple, No JVD  CVS- regular, S1 and S2 normal  Chest- Bilateral Air entry, No rhochi, crackles or wheezing present  Abdomen- soft, nontender, not distended, no guarding or rigidity, BS+  Extremities-  No pedal edema, No calf tenderness  Able to move all extremities  CNS-   Alert, awake and orientedx3  No focal deficits present  Invasive Devices  Report    Peripheral Intravenous Line            Peripheral IV 02/16/22 Dorsal (posterior); Right Wrist 1 day          Drain            Urethral Catheter 18 Fr  1 day                      Social History  reviewed  History reviewed  No pertinent family history   reviewed    Meds:  Current Facility-Administered Medications   Medication Dose Route Frequency Provider Last Rate Last Admin    acetaminophen (TYLENOL) tablet 650 mg  650 mg Oral Q6H PRN Jose L Ochoa PA-C   650 mg at 02/17/22 1120    amLODIPine (NORVASC) tablet 10 mg  10 mg Oral Daily Kate Jang PA-C   10 mg at 02/17/22 0854    aspirin chewable tablet 81 mg  81 mg Oral Daily Kate Jang PA-C   81 mg at 02/17/22 0854    atorvastatin (LIPITOR) tablet 40 mg  40 mg Oral Daily With Tealium Laine PA-C   40 mg at 02/16/22 1636    cefTRIAXone (ROCEPHIN) IVPB (premix in dextrose) 1,000 mg 50 mL  1,000 mg Intravenous Q24H Dotty Spies, PA-C 100 mL/hr at 02/16/22 2046 1,000 mg at 02/16/22 2046    escitalopram (LEXAPRO) tablet 5 mg  5 mg Oral Daily Dotty Spies, PA-C   5 mg at 02/17/22 0854    gabapentin (NEURONTIN) capsule 100 mg  100 mg Oral TID Dotty Spies, PA-C   100 mg at 02/17/22 0854    heparin (porcine) subcutaneous injection 5,000 Units  5,000 Units Subcutaneous [facility] Dotty Spies, PA-C   5,000 Units at 02/17/22 0521    loratadine (CLARITIN) tablet 10 mg  10 mg Oral Daily Dotty Spies, PA-C   10 mg at 02/17/22 0854    losartan (COZAAR) tablet 100 mg  100 mg Oral Daily Dotty Spies, PA-C   100 mg at 02/17/22 0853    ondansetron (ZOFRAN) injection 4 mg  4 mg Intravenous Q6H PRN Dotty Spies, PA-C        oxyCODONE (ROXICODONE) IR tablet 2 5 mg  2 5 mg Oral Q6H PRN Dotty Spies, PA-C   2 5 mg at 02/17/22 0855    pantoprazole (PROTONIX) EC tablet 40 mg  40 mg Oral Daily Dotty Spies, PA-C   40 mg at 02/17/22 0854      Medications Prior to Admission   Medication    amLODIPine (NORVASC) 10 mg tablet    aspirin 81 mg chewable tablet    cetirizine (ZyrTEC) 10 mg tablet    cholecalciferol (VITAMIN D3) 1,000 units tablet    Cranberry (ELLURA PO)    cyanocobalamin (VITAMIN B-12) 500 MCG tablet    escitalopram (LEXAPRO) 5 mg tablet    gabapentin (NEURONTIN) 100 mg capsule    HYDROcodone-acetaminophen (NORCO) 5-325 mg per tablet    losartan (COZAAR) 50 mg tablet    nystatin (MYCOSTATIN) cream    nystatin (MYCOSTATIN) powder    pantoprazole (PROTONIX) 40 mg tablet    rosuvastatin (CRESTOR) 20 MG tablet    traZODone (DESYREL) 50 mg tablet    LORazepam (ATIVAN) 0 5 mg tablet       Labs:  Results from last 7 days   Lab Units 02/17/22  0344 02/16/22  0412 02/15/22  1816 02/15/22  1553   WBC Thousand/uL 7 33 8 96  --  7 21   HEMOGLOBIN g/dL 10 2* 11 2*  --  11 1*   I STAT HEMOGLOBIN g/dl  --   --  10 9*  -- HEMATOCRIT % 33 7* 35 4  --  35 9   HEMATOCRIT, ISTAT %  --   --  32*  --    PLATELETS Thousands/uL 296 260  --  283   NEUTROS PCT % 50 57  --  71   LYMPHS PCT % 33 31  --  17   MONOS PCT % 11 10  --  9   EOS PCT % 5 2  --  2     Results from last 7 days   Lab Units 02/17/22  0344 02/16/22  0412 02/15/22  1816 02/15/22  1553   POTASSIUM mmol/L 4 5 5 1  --  4 7   CHLORIDE mmol/L 107 105  --  100   CO2 mmol/L 29 28  --  29   CO2, I-STAT mmol/L  --   --  28  --    BUN mg/dL 19 14  --  19   CREATININE mg/dL 1 33* 1 31*  --  1 57*   CALCIUM mg/dL 8 6 9 1  --  8 8   ALK PHOS U/L  --   --   --  97   ALT U/L  --   --   --  12   AST U/L  --   --   --  12   GLUCOSE, ISTAT mg/dl  --   --  108  --      No results found for: TROPONINI, CKMB, CKTOTAL      Lab Results   Component Value Date    URINECX Culture too young- will reincubate 02/15/2022         Imaging:  Results for orders placed during the hospital encounter of 02/15/22    XR chest portable    Narrative  CHEST    INDICATION:   sob  Status post fall  COMPARISON:  None    EXAM PERFORMED/VIEWS:  XR CHEST PORTABLE      FINDINGS:    Cardiomediastinal silhouette appears unremarkable  The lungs are clear  No pneumothorax or pleural effusion  Bilateral shoulder arthroplasties  Impression  No acute cardiopulmonary disease  Workstation performed: [id number]    No results found for this or any previous visit        Last 24 Hours Medication List:   Current Facility-Administered Medications   Medication Dose Route Frequency Provider Last Rate    acetaminophen  650 mg Oral Q6H PRN Alysa Moreira PA-C      amLODIPine  10 mg Oral Daily Alysa Moreira PA-C      aspirin  81 mg Oral Daily Alysa Moreira PA-C      atorvastatin  40 mg Oral Daily With DEE Adkins      cefTRIAXone  1,000 mg Intravenous Q24H Alysa Moreira PA-C 1,000 mg (02/16/22 2046)    escitalopram  5 mg Oral Daily Alysa Moreira PA-C      gabapentin  100 mg Oral TID DEE Abrams heparin (porcine)  5,000 Units Subcutaneous [facility] Areta Modest, PA-C      loratadine  10 mg Oral Daily Areta Modest, PA-C      losartan  100 mg Oral Daily Areta Modest, PA-C      ondansetron  4 mg Intravenous Q6H PRN Areta Modest, PA-C      oxyCODONE  2 5 mg Oral Q6H PRN Areta Modest, PA-C      pantoprazole  40 mg Oral Daily Areta Modest, PAWiliC          Today, Patient Was Seen By: Juanjo Eugene MD    ** Please Note: Dictation voice to text software may have been used in the creation of this document   **

## 2022-02-17 NOTE — PLAN OF CARE
Problem: PHYSICAL THERAPY ADULT  Goal: Performs mobility at highest level of function for planned discharge setting  See evaluation for individualized goals  Description: Treatment/Interventions: ADL retraining,Functional transfer training,LE strengthening/ROM,Elevations,Therapeutic exercise,Endurance training,Cognitive reorientation,Patient/family training,Equipment eval/education,Bed mobility,Gait training,Compensatory technique education,Continued evaluation,OT          See flowsheet documentation for full assessment, interventions and recommendations  Outcome: Progressing  Note: Prognosis: Guarded  Problem List: Decreased strength,Decreased endurance,Impaired balance,Decreased mobility,Decreased cognition,Impaired judgement,Obesity,Pain  Assessment: Patient was received supine in bed  Agreeable to session despite reporting high pain levels  Patient progressed this session and required less assistance and was also able to perform transfers and amb a short distance  Patient reported dizziness in seated position  BP in sitting 112/81  After amb to chair (sitting position) 145/64  Patient continues to require assistance of 2 and rehab is recommended  The patient's AM-PAC Basic Mobility Inpatient Short Form Raw Score is 10  A Raw score of less than or equal to 17 suggests the patient may benefit from discharge to post-acute rehabilitation services  Please also refer to the recommendation of the Physical Therapist for safe discharge planning  Barriers to Discharge: Inaccessible home environment,Decreased caregiver support        PT Discharge Recommendation: Post acute rehabilitation services          See flowsheet documentation for full assessment

## 2022-02-17 NOTE — OCCUPATIONAL THERAPY NOTE
Occupational Therapy Evaluation     Patient Name: Zuleyma Reynoso  AEXOF'X Date: 2/17/2022  Problem List  Principal Problem:    Acute metabolic encephalopathy  Active Problems:    UTI (urinary tract infection)    Left arm pain    RYANNE (acute kidney injury) ([facility] [address] )    Essential hypertension    Anxiety and depression    Hyponatremia    Past Medical History  Past Medical History:   Diagnosis Date    Arthritis     Psoriatic    Cancer of kidney, right ([facility] [address] )     COVID-19 12/09/2021    Hypertension     Renal disorder      Past Surgical History  Past Surgical History:   Procedure Laterality Date    HYSTERECTOMY      JOINT REPLACEMENT Bilateral     NEPHRECTOMY Right 02/07/2006    TOTAL SHOULDER REPLACEMENT Bilateral 02/14/2020 02/17/22 1155   OT Last Visit   OT Visit Date 02/17/22   Note Type   Note type Evaluation   Restrictions/Precautions   Weight Bearing Precautions Per Order No   Other Precautions Fall Risk;Cognitive; Chair Alarm;Pain   Pain Assessment   Pain Assessment Tool 0-10   Pain Score 9   Pain Location/Orientation Orientation: Left; Location: Neck; Location: Arm;Location: Leg   Patient's Stated Pain Goal No pain   Home Living   Type of [address] [place] [address] Two level; Able to live on main level with bedroom/bathroom   Home Equipment Other (Comment); Hospital bed;[facility])   Additional Comments Pt has been living at daughter's home since 1/2022  Pt unable to provide social hx  Prior Function   Level of [place] Needs assistance with ADLs and functional mobility   Lives With Daughter   Receives Help From Family   ADL Assistance Needs assistance   IADLs Needs assistance   Vocational Retired   Subjective   Subjective Pt received in supine position  Pt agreeable to session      ADL   Eating Assistance 5  Supervision/Setup   Grooming Assistance 5  Supervision/Setup   UB Bathing Assistance 3  Moderate Assistance   LB Bathing Assistance 2  Maximal Assistance   [address] 3 Moderate Assistance   LB Dressing Assistance 2  Maximal Assistance   Toileting Assistance  3  Moderate Assistance   Bed Mobility   Supine to Sit 3  Moderate assistance   Additional items Assist x 2;Verbal cues   Sit to Supine 2  Maximal assistance   Additional items Assist x 2;Verbal cues   Additional Comments Pt endorsing dizziness  VSS  Transfers   Sit to Stand 3  Moderate assistance   Additional items Assist x 2;Verbal cues   Stand to Sit 3  Moderate assistance   Additional items Assist x 2;Verbal cues   Stand pivot 3  Moderate assistance   Additional items Assist x 2;Verbal cues  (RW)   Balance   Static Sitting Fair   Dynamic Sitting Fair -   Static Standing Poor +   Dynamic Standing Poor +   Activity Tolerance   Activity Tolerance Patient limited by pain   Medical Staff Made Aware PT Dahiana Leonard   Nurse Made Aware CHEVY Palmer   RUE Assessment   RUE Assessment WFL   LUE Assessment   LUE Assessment X  (shoulder 0-45 degrees, hand 1/4 composite flexion)   Hand Function   Fine Motor Coordination Impaired  (LUE )   Cognition   Overall Cognitive Status Impaired   Arousal/Participation Alert   Attention Attends with cues to redirect   Orientation Level Oriented to person;Oriented to place   Memory Decreased recall of precautions;Decreased recall of recent events;Decreased short term memory   Following Commands Follows one step commands with increased time or repetition   Assessment   Limitation Decreased ADL status; Decreased self-care trans;Decreased high-level ADLs   Prognosis Good   Assessment Pt is a 80 y o  female seen for OT evaluation at [address] [place] [address], admitted 4/86/8168 w/ Acute metabolic encephalopathy  Pt with confusion and agitation  OT completed extensive review of pt's medical and social history  Comorbidities affecting pt's functional performance at time of assessment include: UTI, left UE pain, RYANNE, anxiety and depression, essential HTN, etc (see chart)    Personal factors affecting pt at time of IE include:difficulty performing ADLS, difficulty performing IADLS  and decreased functional mobility  Prior to admission, pt was living with daughter in house with 1st floor set-up  Pt required assist with w/  ADLS and IADLS, & required use of RW PTA  Upon evaluation: Pt requires mod Ax 2 for bed mobility, mod Ax 2 for functional mobility/transfers, sup-mod A for UB ADLs and max A for LB ADLS 2* the following deficits impacting occupational performance: weakness, decreased strength, decreased balance, decreased tolerance, impaired memory and increased pain  Pt to benefit from continued skilled OT tx while in the hospital to address deficits as defined above and maximize level of functional independence w ADL's and functional mobility  Occupational Performance areas to address include: bathing/shower, toilet hygiene, dressing and functional mobility  Based on findings, pt is of high complexity  The patient's raw score on the AM-PAC Daily Activity inpatient short form is 15, standardized score is 34 69, less than 39 4  Patients at this level are likely to benefit from DC to post-acute rehabilitation services  Please refer to the recommendation of the Occupational Therapist for safe DC planning  At this time, OT recommendations at time of discharge are short term rehab  Goals   Patient Goals Pt wishes for LUE pain to improve   Plan   Treatment Interventions ADL retraining;Functional transfer training;Patient/family training; Endurance training;Cognitive reorientation; Compensatory technique education   Goal Expiration Date 02/27/22   OT Treatment Day 0   OT Frequency 3-5x/wk   Recommendation   OT Discharge Recommendation Post acute rehabilitation services   AM-[facility] Daily Activity Inpatient   Lower Body Dressing 2   Bathing 2   Toileting 2   Upper Body Dressing 2   Grooming 3   Eating 4   Daily Activity Raw Score 15   Daily Activity Standardized Score (Calc for Raw Score >=11) 34 69   AM-[facility] Applied Cognition Inpatient Following a Speech/Presentation 2   Understanding Ordinary Conversation 3   Taking Medications 2   Remembering Where Things Are Placed or Put Away 2   Remembering List of 4-5 Errands 1   Taking Care of Complicated Tasks 1   Applied Cognition Raw Score 11   Applied Cognition Standardized Score 27 03           Pt will achieve the following goals within 10 days  *Pt will complete UB bathing and dressing with min A     *Pt will complete LB bathing and dressing with mind A      * Pt will complete toileting w/ min A w/ G hygiene/thoroughness using DME PRN    *Pt will complete bed mobility with min Ax 1, with bed flat and no side rail to prep for purposeful tasks    *Pt will perform functional transfers with on/off all surfaces with min Ax 1 using DME as needed w/ G balance/safety  *Pt will increase standing tolerance to 5 minutes in order to complete sinkside ADL task  *Pt will improve functional mobility during ADL/IADL/leisure tasks to min Ax 1 using DME as needed w/ G balance/safety         [place], OTR/L

## 2022-02-17 NOTE — ASSESSMENT & PLAN NOTE
· Started on Bactrim outpatient on Friday 2/11 but continued to have worsening AMS  · Chronic Ambrosio catheter    Follows with Urology outpatient  · Urine 2/15 (partially treated)  · UA:  Moderate amount of leukocytes, negative nitrate  · Micro:  10-20 WBC, occasional bacteria  · Not meeting SIRS/sepsis  · Ctn on IV ceftriaxone  · Will discontinue Ambrosio catheter and give voiding trial  · Urine culture pending  · Trend WBC and fever curve

## 2022-02-17 NOTE — PLAN OF CARE
Problem: OCCUPATIONAL THERAPY ADULT  Goal: Performs self-care activities at highest level of function for planned discharge setting  See evaluation for individualized goals  Description: Treatment Interventions: ADL retraining,Functional transfer training,Patient/family training,Endurance training,Cognitive reorientation,Compensatory technique education          See flowsheet documentation for full assessment, interventions and recommendations  Note: Limitation: Decreased ADL status,Decreased self-care trans,Decreased high-level ADLs  Prognosis: Good  Assessment: Pt is a 80 y o  female seen for OT evaluation at [facility], admitted 1/70/2531 w/ Acute metabolic encephalopathy  Pt with confusion and agitation  OT completed extensive review of pt's medical and social history  Comorbidities affecting pt's functional performance at time of assessment include: UTI, left UE pain, YRANNE, anxiety and depression, essential HTN, etc (see chart)   Personal factors affecting pt at time of IE include:difficulty performing ADLS, difficulty performing IADLS  and decreased functional mobility  Prior to admission, pt was living with daughter in house with 1st floor set-up  Pt required assist with w/  ADLS and IADLS, & required use of RW PTA  Upon evaluation: Pt requires mod Ax 2 for bed mobility, mod Ax 2 for functional mobility/transfers, sup-mod A for UB ADLs and max A for LB ADLS 2* the following deficits impacting occupational performance: weakness, decreased strength, decreased balance, decreased tolerance, impaired memory and increased pain  Pt to benefit from continued skilled OT tx while in the hospital to address deficits as defined above and maximize level of functional independence w ADL's and functional mobility  Occupational Performance areas to address include: bathing/shower, toilet hygiene, dressing and functional mobility  Based on findings, pt is of high complexity   The patient's raw score on the AM-PAC Daily Activity inpatient short form is 15, standardized score is 34 69, less than 39 4  Patients at this level are likely to benefit from DC to post-acute rehabilitation services  Please refer to the recommendation of the Occupational Therapist for safe DC planning  At this time, OT recommendations at time of discharge are short term rehab       OT Discharge Recommendation: Post acute rehabilitation services

## 2022-02-17 NOTE — ASSESSMENT & PLAN NOTE
· Brought in by family due to worsening cognition and agitation  · Since January patient has gradually become more confused at times but since Wednesday 2/9 patient not oriented to place or time  · CT head negative  · Diagnosed with UTI outpatient on 02/11, started on Bactrim  ED placed patient on IV ceftriaxone  · Continue Rocephin  · Recent medication changes  · Ctn on gabapentin 100 mg t i d  · Started on Friday due to tremors being noted over the past couple of weeks  Being worked up for [place]  · Transitioned from Cymbalta to Lexapro 5 mg (last week)  · Ativan 0 5 mg every 8 hours (last week)  · Started due to anxiety  Daughter believes this has made patient's agitation worse since starting  · losartan increased from 50 to 100mg (last week)  · Trazodone 25 mg to 50 mg (January)  · Per daughter confusion was noted to gradually occur in January once trazodone dose was increased  · Hold trazodone due to current somnolent exam  · Required Ativan and Zyprexa in the ED due to agitated and screaming    Patient seen after and is sleeping in bed, wakes up to stimuli and then goes back to sleep  · Continue to monitor  · Consult case management

## 2022-02-17 NOTE — PHYSICAL THERAPY NOTE
PHYSICAL THERAPY NOTE       02/17/22 1153   PT Last Visit   PT Visit Date 02/17/22   Note Type   Note Type Treatment   Pain Assessment   Pain Assessment Tool 0-10   Pain Score 9   Pain Location/Orientation   (Neck, LUE and LLE)   Restrictions/Precautions   Weight Bearing Precautions Per Order No   Other Precautions Pain; Fall Risk; Chair Alarm;Cognitive   General   Chart Reviewed Yes   Additional Pertinent History CT C-spine: negative acute fx   Response to Previous Treatment Patient with no complaints from previous session  Family/Caregiver Present No   Cognition   Overall Cognitive Status Impaired   Arousal/Participation Alert   Attention Attends with cues to redirect   Orientation Level Oriented to person;Oriented to place  (Able to state the month  Incorrect year)   Memory Decreased recall of recent events   Comments Repeating self   Subjective   Subjective "I have had this pain for a long time"   Bed Mobility   Supine to Sit 3  Moderate assistance   Additional items Assist x 2;HOB elevated; Bedrails; Increased time required;Verbal cues;LE management   Additional Comments Sat edge of bed for 5 minutes with min A for trunk support  BP in seated 112/81  Patient reported dizziness  Improved after sitting for extended period of time  Transfers   Sit to Stand 3  Moderate assistance   Additional items Assist x 2;Armrests; Increased time required;Verbal cues   Stand to Sit 3  Moderate assistance   Additional items Assist x 2;Armrests; Increased time required;Verbal cues   Ambulation/Elevation   Gait pattern Shuffling; Inconsistent kaleb  (fast paced gait )   Gait Assistance 3  Moderate assist   Additional items Assist x 2;Verbal cues; Tactile cues   Assistive Device Rolling walker   Distance 3ft  (bed to chair)   Ambulation/Elevation Additional Comments Patient moving quickly and attempting to sit too soon      Balance   Static Sitting Fair Dynamic Sitting Fair -   Static Standing Poor +   Dynamic Standing Poor +   Ambulatory Poor +   Endurance Deficit   Endurance Deficit Yes   Endurance Deficit Description Limited by pain   Activity Tolerance   Activity Tolerance Patient limited by pain   Assessment   Prognosis Guarded   Problem List Decreased strength;Decreased endurance; Impaired balance;Decreased mobility; Decreased cognition; Impaired judgement;Obesity;Pain   Assessment Patient was received supine in bed  Agreeable to session despite reporting high pain levels  Patient progressed this session and required less assistance and was also able to perform transfers and amb a short distance  Patient reported dizziness in seated position  BP in sitting 112/81  After amb to chair (sitting position) 145/64  Patient continues to require assistance of 2 and rehab is recommended  The patient's [facility] Basic Mobility Inpatient Short Form Raw Score is 10  A Raw score of less than or equal to 17 suggests the patient may benefit from discharge to post-acute rehabilitation services  Please also refer to the recommendation of the Physical Therapist for safe discharge planning  Barriers to Discharge Inaccessible home environment;Decreased caregiver support   Goals   Patient Goals To have less pain   STG Expiration Date 03/02/22   PT Treatment Day 1   Plan   Treatment/Interventions Functional transfer training;LE strengthening/ROM; Therapeutic exercise; Endurance training;Cognitive reorientation;Equipment eval/education; Bed mobility;Gait training;Spoke to nursing;OT   Progress Slow progress, decreased activity tolerance   PT Frequency 3-5x/wk   Recommendation   PT Discharge Recommendation Post acute rehabilitation services   [facility] Basic Mobility Inpatient   Turning in Bed Without Bedrails 2   Lying on Back to Sitting on Edge of Flat Bed 2   Moving Bed to Chair 2   Standing Up From Chair 2   Walk in Room 1   Climb 3-5 Stairs 1   Basic Mobility Inpatient Raw Score 10 Turning Head Towards Sound 4   Follow Simple Instructions 3   Low Function Basic Mobility Raw Score 17   Low Function Basic Mobility Standardized Score 27 46   Highest Level Of Mobility   -[facility] Goal 4: Move to chair/commode   Education   Education Provided Mobility training;Assistive device   Patient Reinforcement needed   End of Consult   Patient Position at End of Consult Bedside chair;Bed/Chair alarm activated; All needs within reach       Estephania Spivey PT        Patient Name: Parag Tucker  MNYXP'I Date: 2/17/2022

## 2022-02-17 NOTE — NURSING NOTE
Pt and pt's family wanted pt to get up in chair  Pt was seen by PT earlier and failed to be able to get up  Pt was able to sit up at edge of bed and stand for short time with full support from 3 nurses  Pt could not get into chair at this time  Pt was then put back into bed and made comfortable  Pt tolerated mobility fairly well, still very weak at this time and not yet at baseline       Zuleyma Velasquez RN

## 2022-02-17 NOTE — PLAN OF CARE
Problem: Potential for Falls  Goal: Patient will remain free of falls  Description: INTERVENTIONS:  - Educate patient/family on patient safety including physical limitations  - Instruct patient to call for assistance with activity   - Consult OT/PT to assist with strengthening/mobility   - Keep Call bell within reach  - Keep bed low and locked with side rails adjusted as appropriate  - Keep care items and personal belongings within reach  - Initiate and maintain comfort rounds  - Make Fall Risk Sign visible to staff  - Offer Toileting every 2   Hours, in advance of need  - Initiate/Maintain bedalarm  - Obtain necessary fall risk management equipment: nonskid, alarm, sign, bracelet    - Apply yellow socks and bracelet for high fall risk patients  - Consider moving patient to room near nurses station  Outcome: Progressing

## 2022-02-17 NOTE — ASSESSMENT & PLAN NOTE
· Chronic left arm/neck pain since fall in March 2021  At that time no fractures noted   · Over the past year daughter reports intermittent pain and swelling to the area that is treated with hydrocodone and nerve block  Was not successful this time  · Denies recent fall or injury to the area  · Manage pain   · Daughter requested CT cervical spine    Will order CT scan C-spine  · PT/OT consult appreciated

## 2022-02-18 VITALS
HEIGHT: 67 IN | OXYGEN SATURATION: 94 % | WEIGHT: 257.72 LBS | RESPIRATION RATE: 20 BRPM | DIASTOLIC BLOOD PRESSURE: 86 MMHG | SYSTOLIC BLOOD PRESSURE: 152 MMHG | TEMPERATURE: 96.9 F | BODY MASS INDEX: 40.45 KG/M2 | HEART RATE: 75 BPM

## 2022-02-18 LAB
ANION GAP SERPL CALCULATED.3IONS-SCNC: 6 MMOL/L (ref 4–13)
BACTERIA UR CULT: ABNORMAL
BASOPHILS # BLD AUTO: 0.05 THOUSANDS/ΜL (ref 0–0.1)
BASOPHILS NFR BLD AUTO: 1 % (ref 0–1)
BUN SERPL-MCNC: 24 MG/DL (ref 5–25)
CALCIUM SERPL-MCNC: 8.4 MG/DL (ref 8.3–10.1)
CHLORIDE SERPL-SCNC: 107 MMOL/L (ref 100–108)
CO2 SERPL-SCNC: 28 MMOL/L (ref 21–32)
CREAT SERPL-MCNC: 1.29 MG/DL (ref 0.6–1.3)
EOSINOPHIL # BLD AUTO: 0.45 THOUSAND/ΜL (ref 0–0.61)
EOSINOPHIL NFR BLD AUTO: 6 % (ref 0–6)
ERYTHROCYTE [DISTWIDTH] IN BLOOD BY AUTOMATED COUNT: 13.5 % (ref 11.6–15.1)
GFR SERPL CREATININE-BSD FRML MDRD: 37 ML/MIN/1.73SQ M
GLUCOSE SERPL-MCNC: 98 MG/DL (ref 65–140)
HCT VFR BLD AUTO: 33.3 % (ref 34.8–46.1)
HGB BLD-MCNC: 10.1 G/DL (ref 11.5–15.4)
IMM GRANULOCYTES # BLD AUTO: 0.01 THOUSAND/UL (ref 0–0.2)
IMM GRANULOCYTES NFR BLD AUTO: 0 % (ref 0–2)
LYMPHOCYTES # BLD AUTO: 2.54 THOUSANDS/ΜL (ref 0.6–4.47)
LYMPHOCYTES NFR BLD AUTO: 36 % (ref 14–44)
MCH RBC QN AUTO: 28.9 PG (ref 26.8–34.3)
MCHC RBC AUTO-ENTMCNC: 30.3 G/DL (ref 31.4–37.4)
MCV RBC AUTO: 95 FL (ref 82–98)
MONOCYTES # BLD AUTO: 0.73 THOUSAND/ΜL (ref 0.17–1.22)
MONOCYTES NFR BLD AUTO: 10 % (ref 4–12)
NEUTROPHILS # BLD AUTO: 3.33 THOUSANDS/ΜL (ref 1.85–7.62)
NEUTS SEG NFR BLD AUTO: 47 % (ref 43–75)
NRBC BLD AUTO-RTO: 0 /100 WBCS
PLATELET # BLD AUTO: 226 THOUSANDS/UL (ref 149–390)
PMV BLD AUTO: 8.5 FL (ref 8.9–12.7)
POTASSIUM SERPL-SCNC: 4.5 MMOL/L (ref 3.5–5.3)
RBC # BLD AUTO: 3.5 MILLION/UL (ref 3.81–5.12)
SODIUM SERPL-SCNC: 141 MMOL/L (ref 136–145)
WBC # BLD AUTO: 7.11 THOUSAND/UL (ref 4.31–10.16)

## 2022-02-18 PROCEDURE — 80048 BASIC METABOLIC PNL TOTAL CA: CPT | Performed by: INTERNAL MEDICINE

## 2022-02-18 PROCEDURE — 99239 HOSP IP/OBS DSCHRG MGMT >30: CPT | Performed by: INTERNAL MEDICINE

## 2022-02-18 PROCEDURE — 85025 COMPLETE CBC W/AUTO DIFF WBC: CPT | Performed by: INTERNAL MEDICINE

## 2022-02-18 RX ORDER — AMOXICILLIN 500 MG/1
500 TABLET, FILM COATED ORAL 2 TIMES DAILY
Qty: 6 TABLET | Refills: 0 | Status: SHIPPED | OUTPATIENT
Start: 2022-02-18 | End: 2022-02-21

## 2022-02-18 RX ORDER — OXYCODONE HCL 10 MG/1
10 TABLET, FILM COATED, EXTENDED RELEASE ORAL EVERY 12 HOURS SCHEDULED
Qty: 10 TABLET | Refills: 0 | Status: SHIPPED | OUTPATIENT
Start: 2022-02-18 | End: 2022-02-23

## 2022-02-18 RX ORDER — BISACODYL 10 MG
10 SUPPOSITORY, RECTAL RECTAL ONCE
Status: COMPLETED | OUTPATIENT
Start: 2022-02-18 | End: 2022-02-18

## 2022-02-18 RX ADMIN — ESCITALOPRAM 5 MG: 5 TABLET, FILM COATED ORAL at 08:00

## 2022-02-18 RX ADMIN — PANTOPRAZOLE SODIUM 40 MG: 40 TABLET, DELAYED RELEASE ORAL at 08:00

## 2022-02-18 RX ADMIN — GABAPENTIN 100 MG: 100 CAPSULE ORAL at 15:02

## 2022-02-18 RX ADMIN — ATORVASTATIN CALCIUM 40 MG: 40 TABLET, FILM COATED ORAL at 15:02

## 2022-02-18 RX ADMIN — HEPARIN SODIUM 5000 UNITS: 5000 INJECTION, SOLUTION INTRAVENOUS; SUBCUTANEOUS at 05:12

## 2022-02-18 RX ADMIN — LORATADINE 10 MG: 10 TABLET ORAL at 08:00

## 2022-02-18 RX ADMIN — OXYCODONE HYDROCHLORIDE 5 MG: 5 TABLET ORAL at 08:00

## 2022-02-18 RX ADMIN — ASPIRIN 81 MG CHEWABLE TABLET 81 MG: 81 TABLET CHEWABLE at 08:00

## 2022-02-18 RX ADMIN — GABAPENTIN 100 MG: 100 CAPSULE ORAL at 08:00

## 2022-02-18 RX ADMIN — OXYCODONE HYDROCHLORIDE 5 MG: 5 TABLET ORAL at 15:02

## 2022-02-18 RX ADMIN — HEPARIN SODIUM 5000 UNITS: 5000 INJECTION, SOLUTION INTRAVENOUS; SUBCUTANEOUS at 13:13

## 2022-02-18 RX ADMIN — BISACODYL 10 MG: 10 SUPPOSITORY RECTAL at 14:31

## 2022-02-18 RX ADMIN — LOSARTAN POTASSIUM 100 MG: 50 TABLET, FILM COATED ORAL at 08:00

## 2022-02-18 RX ADMIN — AMLODIPINE BESYLATE 10 MG: 5 TABLET ORAL at 08:00

## 2022-02-18 NOTE — ASSESSMENT & PLAN NOTE
· Home regimen Lexapro 5 mg daily, Ativan 0 5 mg every 8 hours p r n, trazodone 50 mg HS  · Hold home Ativan and trazodone while inpatient

## 2022-02-18 NOTE — CASE MANAGEMENT
Case Management Discharge Planning Note    Patient name Lauren Greenfield  Location /-09 MRN [id number]  : 1933 Date 2022       Current Admission Date: 2/15/2022  Current Admission Diagnosis:Acute metabolic encephalopathy   Patient Active Problem List    Diagnosis Date Noted    Hyponatremia 2022    Absent kidney 02/15/2022    Obstructive sleep apnea syndrome 02/15/2022    Acute metabolic encephalopathy     Left arm pain 02/15/2022    RYANNE (acute kidney injury) ([facility] [address] ) 02/15/2022    Essential hypertension 02/15/2022    Anxiety and depression 02/15/2022    Shoulder dislocation 2021    Left upper extremity numbness 2021    Lumbar pain 2021    Weakness 2021    Altered mental status 2021    UTI (urinary tract infection) 2017    Overactive bladder 2011      LOS (days): 3  Geometric Mean LOS (GMLOS) (days): 3 80  Days to GMLOS:1 1     OBJECTIVE:  Risk of Unplanned Readmission Score: 13         Current admission status: Inpatient   Preferred Pharmacy:   [address] [facility]  [address] [facility]  [address] [facility] [address]  Phone: [phone] Fax: [phone]    Primary Care Provider: Keith Malcolm MD    Primary Insurance: MEDICARE  Secondary Insurance: [facility] HEALTH OPTIONS PROGRAM    DISCHARGE DETAILS:    Additional Comments: Received a call back from pt's daughters who had questions regarding pain medications and antibiotics patient will discharge home  Dr Taryn Parr to speak with pt's daughter to further discuss  Pt's daughter Laura Dougherty is requesting records; CM provided medical record phone number and encouraged the use of [email or web address]

## 2022-02-18 NOTE — DISCHARGE INSTR - AVS FIRST PAGE
Follow-up with PCP in 1 week  Follow-up with urology and Pain Management as outpatient  Return to ER with any worsening fever, chills, confusion, abdominal pain, chest pain, shortness of breath or any other alarming symptoms

## 2022-02-18 NOTE — ASSESSMENT & PLAN NOTE
· Brought in by family due to worsening cognition and agitation  · Since January patient has gradually become more confused at times but since Wednesday 2/9 patient not oriented to place or time  · CT head negative  · Diagnosed with UTI outpatient on 02/11, started on Cipro  ED placed patient on IV ceftriaxone  · Continue Rocephin  · Recent medication changes  · Ctn on gabapentin 100 mg t i d  · Started on Friday due to tremors being noted over the past couple of weeks  Being worked up for [employer]  · Transitioned from Cymbalta to Lexapro 5 mg (last week)  · Ativan 0 5 mg every 8 hours (last week)  · Started due to anxiety  Daughter believes this has made patient's agitation worse since starting  · losartan increased from 50 to 100mg (last week)  · Trazodone 25 mg to 50 mg (January)  · Per daughter confusion was noted to gradually occur in January once trazodone dose was increased  · Hold trazodone due to current somnolent exam  · Required Ativan and Zyprexa in the ED due to agitated and screaming  Patient seen after and is sleeping in bed, wakes up to stimuli and then goes back to sleep  · Continue to monitor  · Resolved    Patient is alert awake oriented x4  · Patient will be discharged on amoxicillin to complete the course

## 2022-02-18 NOTE — PLAN OF CARE
Problem: Prexisting or High Potential for Compromised Skin Integrity  Goal: Skin integrity is maintained or improved  Description: INTERVENTIONS:  - Identify patients at risk for skin breakdown  - Assess and monitor skin integrity  - Assess and monitor nutrition and hydration status  - Monitor labs   - Assess for incontinence   - Turn and reposition patient  - Assist with mobility/ambulation  - Relieve pressure over bony prominences  - Avoid friction and shearing  - Provide appropriate hygiene as needed including keeping skin clean and dry  - Evaluate need for skin moisturizer/barrier cream  - Collaborate with interdisciplinary team   - Patient/family teaching  - Consider wound care consult   Outcome: Progressing     Problem: MOBILITY - ADULT  Goal: Maintain or return to baseline ADL function  Description: INTERVENTIONS:  -  Assess patient's ability to carry out ADLs; assess patient's baseline for ADL function and identify physical deficits which impact ability to perform ADLs (bathing, care of mouth/teeth, toileting, grooming, dressing, etc )  - Assess/evaluate cause of self-care deficits   - Assess range of motion  - Assess patient's mobility; develop plan if impaired  - Assess patient's need for assistive devices and provide as appropriate  - Encourage maximum independence but intervene and supervise when necessary  - Involve family in performance of ADLs  - Assess for home care needs following discharge   - Consider OT consult to assist with ADL evaluation and planning for discharge  - Provide patient education as appropriate  Outcome: Progressing  Goal: Maintains/Returns to pre admission functional level  Description: INTERVENTIONS:  - Perform BMAT or MOVE assessment daily    - Set and communicate daily mobility goal to care team and patient/family/caregiver  - Collaborate with rehabilitation services on mobility goals if consulted  - Perform Range of Motion 3 times a day    - Reposition patient every 2 hours   - Dangle patient 3 times a day  - Stand patient 3 times a day  - Ambulate patient 3 times a day  - Out of bed to chair 3 times a day   - Out of bed for meals 3 times a day  - Out of bed for toileting  - Record patient progress and toleration of activity level   Outcome: Progressing     Problem: Potential for Falls  Goal: Patient will remain free of falls  Description: INTERVENTIONS:  - Educate patient/family on patient safety including physical limitations  - Instruct patient to call for assistance with activity   - Consult OT/PT to assist with strengthening/mobility   - Keep Call bell within reach  - Keep bed low and locked with side rails adjusted as appropriate  - Keep care items and personal belongings within reach  - Initiate and maintain comfort rounds  - Make Fall Risk Sign visible to staff  - Offer Toileting every 2 Hours, in advance of need  - Initiate/Maintain bed alarm  - Obtain necessary fall risk management equipment  - Apply yellow socks and bracelet for high fall risk patients  - Consider moving patient to room near nurses station  Outcome: Progressing     Problem: INFECTION - ADULT  Goal: Absence or prevention of progression during hospitalization  Description: INTERVENTIONS:  - Assess and monitor for signs and symptoms of infection  - Monitor lab/diagnostic results  - Monitor all insertion sites, i e  indwelling lines, tubes, and drains  - Monitor endotracheal if appropriate and nasal secretions for changes in amount and color  - [place] appropriate cooling/warming therapies per order  - Administer medications as ordered  - Instruct and encourage patient and family to use good hand hygiene technique  - Identify and instruct in appropriate isolation precautions for identified infection/condition  Outcome: Progressing

## 2022-02-18 NOTE — DISCHARGE SUMMARY
[place]  Discharge- Marva Kuhn 1933, 80 y o  female MRN: [id number]  Unit/Bed#: -01 Encounter: [id number]  Primary Care Provider: Bart Villalpando MD   Date and time admitted to hospital: 2/15/2022  3:00 PM    Hyponatremia  Assessment & Plan  Patient admitted with mild hyponatremia sodium of 134 POA  Resolved with IV fluids    Anxiety and depression  Assessment & Plan  · Home regimen Lexapro 5 mg daily, Ativan 0 5 mg every 8 hours p r n, trazodone 50 mg HS  · Hold home Ativan and trazodone while inpatient    Essential hypertension  Assessment & Plan  · Home regimen Norvasc 10 mg daily, losartan 100 mg daily  · Monitor vitals as per protocol    RYANNE (acute kidney injury) ([facility] [address] )  Assessment & Plan  · Patient admitted with acute kidney injury with creatinine of 1 57, poor oral intake and noted to have dehydration  · Creatinine 1 57 on admission   · Creatinine from 1 year ago was about 1 0  · Received 1 L bolus of fluids and is now on maintenance fluids   · Creatinine this morning is 1 29  · DC IV fluids  · Avoid hypotension/nephrotoxins medications  · Stable at discharge    Left arm pain  Assessment & Plan  · Chronic left arm/neck pain since fall in March 2021  At that time no fractures noted   · Over the past year daughter reports intermittent pain and swelling to the area that is treated with hydrocodone and nerve block  Was not successful this time  · Denies recent fall or injury to the area  · Manage pain   · Daughter requested CT cervical spine  · CT cervical spine results reviewed and discussed with daughter  · She will follow-up with her pain specialist/neurologist as outpatient    UTI (urinary tract infection)  Assessment & Plan  · Started on Bactrim outpatient on Friday 2/11 but continued to have worsening AMS  · Chronic Ambrosio catheter    Follows with Urology outpatient  · Urine 2/15 (partially treated)  · UA:  Moderate amount of leukocytes, negative nitrate  · Micro:  10-20 WBC, occasional bacteria  · Not meeting SIRS/sepsis  · Ctn on IV ceftriaxone  · Patient had indwelling Ambrosio catheter and Ambrosio catheter were placed prior to discharge  · As per daughter patient she PCP she switched patient to Cipro prior to coming to the ER after reviewing the urine culture done as outpatient  · Urine culture grew Enterococcus  · Will discharge on amoxicillin to complete a course  · Patient was seen by PT and recommended skilled nursing rehab  Patient and family diffuse skilled nursing rehab and will be discharged home    * Acute metabolic encephalopathy  Assessment & Plan  · Brought in by family due to worsening cognition and agitation  · Since January patient has gradually become more confused at times but since Wednesday 2/9 patient not oriented to place or time  · CT head negative  · Diagnosed with UTI outpatient on 02/11, started on Cipro  ED placed patient on IV ceftriaxone  · Continue Rocephin  · Recent medication changes  · Ctn on gabapentin 100 mg t i d  · Started on Friday due to tremors being noted over the past couple of weeks  Being worked up for [place]  · Transitioned from Cymbalta to Lexapro 5 mg (last week)  · Ativan 0 5 mg every 8 hours (last week)  · Started due to anxiety  Daughter believes this has made patient's agitation worse since starting  · losartan increased from 50 to 100mg (last week)  · Trazodone 25 mg to 50 mg (January)  · Per daughter confusion was noted to gradually occur in January once trazodone dose was increased  · Hold trazodone due to current somnolent exam  · Required Ativan and Zyprexa in the ED due to agitated and screaming  Patient seen after and is sleeping in bed, wakes up to stimuli and then goes back to sleep  · Continue to monitor  · Resolved    Patient is alert awake oriented x4  · Patient will be discharged on amoxicillin to complete the course    Hospital Course:     Frederick Em is a 80 y o  female patient who originally presented to the hospital on   Admission Orders (From admission, onward)     Ordered        02/15/22 2039  Inpatient Admission  Once                     due to worsening confusion/agitation any increase in chronic pain on her left arm  Patient was admitted with acute metabolic encephalopathy secondary to UTI  Patient was started on Bactrim as outpatient which was switched to ciprofloxacin by PCP after reviewing the urine culture results  Patient was started on Rocephin  Patient was given IV fluids and was started on pain management  Patient mentation came back to baseline  Patient was seen by P T /OT and recommended skilled nursing rehab  Patient will be switched to amoxicillin to complete the course  Patient is hemodynamically stable for discharge  On exam  Chest-clear to auscultation  Abdomen-soft, nontender  Heart-S1-S2 regular  Neuro-alert awake oriented x3  No focal deficits    Please see above list of diagnoses and related plan for additional information  Follow-up with PCP in 1 week  Follow-up with urology, Neurology and Pain Management as outpatient  Return to ER with any worsening fever, chills, confusion, abdominal pain, chest pain, shortness of breath or any other alarming symptoms    Condition at Discharge:  good      Discharge instructions/Information to patient and family:   See after visit summary for information provided to patient and family  Provisions for Follow-Up Care:  See after visit summary for information related to follow-up care and any pertinent home health orders  Disposition:     Home       Discharge Statement:  I spent 45 minutes discharging the patient  This time was spent on the day of discharge  I had direct contact with the patient on the day of discharge   Greater than 50% of the total time was spent examining patient, answering all patient questions, arranging and discussing plan of care with patient as well as directly providing post-discharge instructions  Additional time then spent on discharge activities  Discharge Medications:  See after visit summary for reconciled discharge medications provided to patient and family        ** Please Note: This note has been constructed using a voice recognition system **

## 2022-02-18 NOTE — PLAN OF CARE
Problem: Prexisting or High Potential for Compromised Skin Integrity  Goal: Skin integrity is maintained or improved  Description: INTERVENTIONS:  - Identify patients at risk for skin breakdown  - Assess and monitor skin integrity  - Assess and monitor nutrition and hydration status  - Monitor labs   - Assess for incontinence   - Turn and reposition patient  - Assist with mobility/ambulation  - Relieve pressure over bony prominences  - Avoid friction and shearing  - Provide appropriate hygiene as needed including keeping skin clean and dry  - Evaluate need for skin moisturizer/barrier cream  - Collaborate with interdisciplinary team   - Patient/family teaching  - Consider wound care consult   Outcome: Progressing     Problem: MOBILITY - ADULT  Goal: Maintain or return to baseline ADL function  Description: INTERVENTIONS:  -  Assess patient's ability to carry out ADLs; assess patient's baseline for ADL function and identify physical deficits which impact ability to perform ADLs (bathing, care of mouth/teeth, toileting, grooming, dressing, etc )  - Assess/evaluate cause of self-care deficits   - Assess range of motion  - Assess patient's mobility; develop plan if impaired  - Assess patient's need for assistive devices and provide as appropriate  - Encourage maximum independence but intervene and supervise when necessary  - Involve family in performance of ADLs  - Assess for home care needs following discharge   - Consider OT consult to assist with ADL evaluation and planning for discharge  - Provide patient education as appropriate  Outcome: Progressing  Goal: Maintains/Returns to pre admission functional level  Description: INTERVENTIONS:  - Perform BMAT or MOVE assessment daily    - Set and communicate daily mobility goal to care team and patient/family/caregiver  - Collaborate with rehabilitation services on mobility goals if consulted  - Perform Range of Motion  times a day    - Reposition patient every hours   - Dangle patient  times a day  - Stand patient  times a day  - Ambulate patient  times a day  - Out of bed to chair  times a day   - Out of bed for meals  times a day  - Out of bed for toileting  - Record patient progress and toleration of activity level   Outcome: Progressing     Problem: Potential for Falls  Goal: Patient will remain free of falls  Description: INTERVENTIONS:  - Educate patient/family on patient safety including physical limitations  - Instruct patient to call for assistance with activity   - Consult OT/PT to assist with strengthening/mobility   - Keep Call bell within reach  - Keep bed low and locked with side rails adjusted as appropriate  - Keep care items and personal belongings within reach  - Initiate and maintain comfort rounds  - Make Fall Risk Sign visible to staff  - Offer Toileting every  Hours, in advance of need  - Initiate/Maintaialarm  - Obtain necessary fall risk management equipment:   - Apply yellow socks and bracelet for high fall risk patients  - Consider moving patient to room near nurses station  Outcome: Progressing     Problem: PAIN - ADULT  Goal: Verbalizes/displays adequate comfort level or baseline comfort level  Description: Interventions:  - Encourage patient to monitor pain and request assistance  - Assess pain using appropriate pain scale  - Administer analgesics based on type and severity of pain and evaluate response  - Implement non-pharmacological measures as appropriate and evaluate response  - Consider cultural and social influences on pain and pain management  - Notify physician/advanced practitioner if interventions unsuccessful or patient reports new pain  Outcome: Progressing     Problem: INFECTION - ADULT  Goal: Absence or prevention of progression during hospitalization  Description: INTERVENTIONS:  - Assess and monitor for signs and symptoms of infection  - Monitor lab/diagnostic results  - Monitor all insertion sites, i e  indwelling lines, tubes, and drains  - Monitor endotracheal if appropriate and nasal secretions for changes in amount and color  - [place] appropriate cooling/warming therapies per order  - Administer medications as ordered  - Instruct and encourage patient and family to use good hand hygiene technique  - Identify and instruct in appropriate isolation precautions for identified infection/condition  Outcome: Progressing     Problem: DISCHARGE PLANNING  Goal: Discharge to home or other facility with appropriate resources  Description: INTERVENTIONS:  - Identify barriers to discharge w/patient and caregiver  - Arrange for needed discharge resources and transportation as appropriate  - Identify discharge learning needs (meds, wound care, etc )  - Arrange for interpretive services to assist at discharge as needed  - Refer to Case Management Department for coordinating discharge planning if the patient needs post-hospital services based on physician/advanced practitioner order or complex needs related to functional status, cognitive ability, or social support system  Outcome: Progressing     Problem: Knowledge Deficit  Goal: Patient/family/caregiver demonstrates understanding of disease process, treatment plan, medications, and discharge instructions  Description: Complete learning assessment and assess knowledge base    Interventions:  - Provide teaching at level of understanding  - Provide teaching via preferred learning methods  Outcome: Progressing     Problem: NEUROSENSORY - ADULT  Goal: Achieves stable or improved neurological status  Description: INTERVENTIONS  - Monitor and report changes in neurological status  - Monitor vital signs such as temperature, blood pressure, glucose, and any other labs ordered   - Initiate measures to prevent increased intracranial pressure  - Monitor for seizure activity and implement precautions if appropriate      Outcome: Progressing  Goal: Achieves maximal functionality and self care  Description: INTERVENTIONS  - Monitor swallowing and airway patency with patient fatigue and changes in neurological status  - Encourage and assist patient to increase activity and self care     - Encourage visually impaired, hearing impaired and aphasic patients to use assistive/communication devices  Outcome: Progressing     Problem: CARDIOVASCULAR - ADULT  Goal: Maintains optimal cardiac output and hemodynamic stability  Description: INTERVENTIONS:  - Monitor I/O, vital signs and rhythm  - Monitor for S/S and trends of decreased cardiac output  - Administer and titrate ordered vasoactive medications to optimize hemodynamic stability  - Assess quality of pulses, skin color and temperature  - Assess for signs of decreased coronary artery perfusion  - Instruct patient to report change in severity of symptoms  Outcome: Progressing  Goal: Absence of cardiac dysrhythmias or at baseline rhythm  Description: INTERVENTIONS:  - Continuous cardiac monitoring, vital signs, obtain 12 lead EKG if ordered  - Administer antiarrhythmic and heart rate control medications as ordered  - Monitor electrolytes and administer replacement therapy as ordered  Outcome: Progressing     Problem: RESPIRATORY - ADULT  Goal: Achieves optimal ventilation and oxygenation  Description: INTERVENTIONS:  - Assess for changes in respiratory status  - Assess for changes in mentation and behavior  - Position to facilitate oxygenation and minimize respiratory effort  - Oxygen administered by appropriate delivery if ordered  - Initiate smoking cessation education as indicated  - Encourage broncho-pulmonary hygiene including cough, deep breathe, Incentive Spirometry  - Assess the need for suctioning and aspirate as needed  - Assess and instruct to report SOB or any respiratory difficulty  - Respiratory Therapy support as indicated  Outcome: Progressing     Problem: GASTROINTESTINAL - ADULT  Goal: Minimal or absence of nausea and/or vomiting  Description: INTERVENTIONS:  - Administer IV fluids if ordered to ensure adequate hydration  - Maintain NPO status until nausea and vomiting are resolved  - Nasogastric tube if ordered  - Administer ordered antiemetic medications as needed  - Provide nonpharmacologic comfort measures as appropriate  - Advance diet as tolerated, if ordered  - Consider nutrition services referral to assist patient with adequate nutrition and appropriate food choices  Outcome: Progressing  Goal: Maintains adequate nutritional intake  Description: INTERVENTIONS:  - Monitor percentage of each meal consumed  - Identify factors contributing to decreased intake, treat as appropriate  - Assist with meals as needed  - Monitor I&O, weight, and lab values if indicated  - Obtain nutrition services referral as needed  Outcome: Progressing  Goal: Oral mucous membranes remain intact  Description: INTERVENTIONS  - Assess oral mucosa and hygiene practices  - Implement preventative oral hygiene regimen  - Implement oral medicated treatments as ordered  - Initiate Nutrition services referral as needed  Outcome: Progressing     Problem: GENITOURINARY - ADULT  Goal: Maintains or returns to baseline urinary function  Description: INTERVENTIONS:  - Assess urinary function  - Encourage oral fluids to ensure adequate hydration if ordered  - Administer IV fluids as ordered to ensure adequate hydration  - Administer ordered medications as needed  - Offer frequent toileting  - Follow urinary retention protocol if ordered  Outcome: Progressing  Goal: Absence of urinary retention  Description: INTERVENTIONS:  - Assess patients ability to void and empty bladder  - Monitor I/O  - Bladder scan as needed  - Discuss with physician/AP medications to alleviate retention as needed  - Discuss catheterization for long term situations as appropriate  Outcome: Progressing  Goal: Urinary catheter remains patent  Description: INTERVENTIONS:  - Assess patency of urinary catheter  - If patient has a chronic spencer, consider changing catheter if non-functioning  - Follow guidelines for intermittent irrigation of non-functioning urinary catheter  Outcome: Progressing     Problem: METABOLIC, FLUID AND ELECTROLYTES - ADULT  Goal: Electrolytes maintained within normal limits  Description: INTERVENTIONS:  - Monitor labs and assess patient for signs and symptoms of electrolyte imbalances  - Administer electrolyte replacement as ordered  - Monitor response to electrolyte replacements, including repeat lab results as appropriate  - Instruct patient on fluid and nutrition as appropriate  Outcome: Progressing  Goal: Fluid balance maintained  Description: INTERVENTIONS:  - Monitor labs   - Monitor I/O and WT  - Instruct patient on fluid and nutrition as appropriate  - Assess for signs & symptoms of volume excess or deficit  Outcome: Progressing  Goal: Glucose maintained within target range  Description: INTERVENTIONS:  - Monitor Blood Glucose as ordered  - Assess for signs and symptoms of hyperglycemia and hypoglycemia  - Administer ordered medications to maintain glucose within target range  - Assess nutritional intake and initiate nutrition service referral as needed  Outcome: Progressing     Problem: HEMATOLOGIC - ADULT  Goal: Maintains hematologic stability  Description: INTERVENTIONS  - Assess for signs and symptoms of bleeding or hemorrhage  - Monitor labs  - Administer supportive blood products/factors as ordered and appropriate  Outcome: Progressing

## 2022-02-18 NOTE — CASE MANAGEMENT
Case Management Discharge Planning Note    Patient name Rema Schuster  Location /-47 MRN [id number]  : 1933 Date 2022       Current Admission Date: 2/15/2022  Current Admission Diagnosis:Acute metabolic encephalopathy   Patient Active Problem List    Diagnosis Date Noted    Hyponatremia 2022    Absent kidney 02/15/2022    Obstructive sleep apnea syndrome 02/15/2022    Acute metabolic encephalopathy     Left arm pain 02/15/2022    RYANNE (acute kidney injury) ([facility] [address] ) 02/15/2022    Essential hypertension 02/15/2022    Anxiety and depression 02/15/2022    Shoulder dislocation 2021    Left upper extremity numbness 2021    Lumbar pain 2021    Weakness 2021    Altered mental status 2021    UTI (urinary tract infection) 2017    Overactive bladder 2011      LOS (days): 3  Geometric Mean LOS (GMLOS) (days): 3 80  Days to GMLOS:1 2     OBJECTIVE:  Risk of Unplanned Readmission Score: 13         Current admission status: Inpatient   Preferred Pharmacy:   [facility], [address]  [address] [facility],[address]  [place] [address]  Phone: [phone] Fax: [phone]    Primary Care Provider: Nandini Torres MD    Primary Insurance: MEDICARE  Secondary Insurance: [facility] HE[employer] OPTIONS PROGRAM    DISCHARGE DETAILS:    Additional Comments: CM was informed by Dr Ramiro Jin that pt is medically stable for dc today  CM called and spoke to pt's daughter Catrachito Cisse to discuss dc plan and recommendation for rehab  Isha states they're not a greeable to rehab and requesting pt return home with [facility]  CM informed Catrachito Cisse that pt is medically stable for dc  CM was informed by Dr Ramiro Jin that family had been looking into a transfer to [facility] but did not medically require that  Catrachito Cisse states she was not able to make calls regarding this today   CM informed Isha that since there is not a medical reason for a transfer transport would not be covered as it is a family request  [facility] states she was receiving a call from another physican and will call CM back  CM called [facility] and spoke to Hosea to inform of pt's dc to home today  Hosea states pt is on service with them; Hosea will have the therapists contact family to schedule visit

## 2022-02-18 NOTE — ASSESSMENT & PLAN NOTE
· Patient admitted with acute kidney injury with creatinine of 1 57, poor oral intake and noted to have dehydration  · Creatinine 1 57 on admission   · Creatinine from 1 year ago was about 1 0  · Received 1 L bolus of fluids and is now on maintenance fluids   · Creatinine this morning is 1 29  · DC IV fluids  · Avoid hypotension/nephrotoxins medications  · Stable at discharge

## 2022-02-18 NOTE — ASSESSMENT & PLAN NOTE
· Started on Bactrim outpatient on Friday 2/11 but continued to have worsening AMS  · Chronic Ambrosio catheter  Follows with Urology outpatient  · Urine 2/15 (partially treated)  · UA:  Moderate amount of leukocytes, negative nitrate  · Micro:  10-20 WBC, occasional bacteria  · Not meeting SIRS/sepsis  · Ctn on IV ceftriaxone  · Patient had indwelling Ambrosio catheter and Ambrosio catheter were placed prior to discharge  · As per daughter patient she PCP she switched patient to Cipro prior to coming to the ER after reviewing the urine culture done as outpatient  · Urine culture grew Enterococcus  · Will discharge on amoxicillin to complete a course  · Patient was seen by PT and recommended skilled nursing rehab    Patient and family diffuse skilled nursing rehab and will be discharged home

## 2022-02-18 NOTE — INCIDENTAL FINDINGS
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

## 2022-02-18 NOTE — ASSESSMENT & PLAN NOTE
· Chronic left arm/neck pain since fall in March 2021  At that time no fractures noted   · Over the past year daughter reports intermittent pain and swelling to the area that is treated with hydrocodone and nerve block  Was not successful this time  · Denies recent fall or injury to the area  · Manage pain   · Daughter requested CT cervical spine  · CT cervical spine results reviewed and discussed with daughter    · She will follow-up with her pain specialist/neurologist as outpatient

## 2022-02-18 NOTE — DISCHARGE INSTRUCTIONS
Follow-up with PCP in 1 week  Follow-up with urology, Neurology and Pain Management as outpatient  Return to ER with any worsening fever, chills, confusion, abdominal pain, chest pain, shortness of breath or any other alarming symptoms